# Patient Record
Sex: MALE | Race: WHITE | NOT HISPANIC OR LATINO | Employment: FULL TIME | URBAN - METROPOLITAN AREA
[De-identification: names, ages, dates, MRNs, and addresses within clinical notes are randomized per-mention and may not be internally consistent; named-entity substitution may affect disease eponyms.]

---

## 2023-11-13 ENCOUNTER — HOSPITAL ENCOUNTER (EMERGENCY)
Facility: HOSPITAL | Age: 33
Discharge: HOME | End: 2023-11-13
Attending: EMERGENCY MEDICINE
Payer: COMMERCIAL

## 2023-11-13 ENCOUNTER — APPOINTMENT (OUTPATIENT)
Dept: RADIOLOGY | Facility: HOSPITAL | Age: 33
End: 2023-11-13
Payer: COMMERCIAL

## 2023-11-13 VITALS
HEART RATE: 92 BPM | HEIGHT: 77 IN | SYSTOLIC BLOOD PRESSURE: 134 MMHG | OXYGEN SATURATION: 96 % | BODY MASS INDEX: 31.29 KG/M2 | WEIGHT: 265 LBS | RESPIRATION RATE: 19 BRPM | DIASTOLIC BLOOD PRESSURE: 83 MMHG | TEMPERATURE: 96.4 F

## 2023-11-13 DIAGNOSIS — M54.10 RADICULOPATHY OF ARM: ICD-10-CM

## 2023-11-13 DIAGNOSIS — M62.838 MUSCLE SPASM: ICD-10-CM

## 2023-11-13 DIAGNOSIS — R07.89 CHEST WALL PAIN: Primary | ICD-10-CM

## 2023-11-13 DIAGNOSIS — I10 PRIMARY HYPERTENSION: ICD-10-CM

## 2023-11-13 LAB
ALBUMIN SERPL BCP-MCNC: 4.4 G/DL (ref 3.4–5)
ALP SERPL-CCNC: 64 U/L (ref 33–120)
ALT SERPL W P-5'-P-CCNC: 51 U/L (ref 10–52)
ANION GAP SERPL CALC-SCNC: 14 MMOL/L (ref 10–20)
AST SERPL W P-5'-P-CCNC: 23 U/L (ref 9–39)
BILIRUB SERPL-MCNC: 0.3 MG/DL (ref 0–1.2)
BUN SERPL-MCNC: 13 MG/DL (ref 6–23)
CALCIUM SERPL-MCNC: 9.8 MG/DL (ref 8.6–10.3)
CARDIAC TROPONIN I PNL SERPL HS: 3 NG/L (ref 0–20)
CHLORIDE SERPL-SCNC: 101 MMOL/L (ref 98–107)
CO2 SERPL-SCNC: 27 MMOL/L (ref 21–32)
CREAT SERPL-MCNC: 1.01 MG/DL (ref 0.5–1.3)
ERYTHROCYTE [DISTWIDTH] IN BLOOD BY AUTOMATED COUNT: 12.8 % (ref 11.5–14.5)
GFR SERPL CREATININE-BSD FRML MDRD: >90 ML/MIN/1.73M*2
GLUCOSE SERPL-MCNC: 131 MG/DL (ref 74–99)
HCT VFR BLD AUTO: 45.2 % (ref 41–52)
HGB BLD-MCNC: 15 G/DL (ref 13.5–17.5)
MCH RBC QN AUTO: 29.7 PG (ref 26–34)
MCHC RBC AUTO-ENTMCNC: 33.2 G/DL (ref 32–36)
MCV RBC AUTO: 90 FL (ref 80–100)
NRBC BLD-RTO: 0 /100 WBCS (ref 0–0)
PLATELET # BLD AUTO: 328 X10*3/UL (ref 150–450)
POTASSIUM SERPL-SCNC: 5.2 MMOL/L (ref 3.5–5.3)
PROT SERPL-MCNC: 7.1 G/DL (ref 6.4–8.2)
RBC # BLD AUTO: 5.05 X10*6/UL (ref 4.5–5.9)
SODIUM SERPL-SCNC: 137 MMOL/L (ref 136–145)
WBC # BLD AUTO: 10.4 X10*3/UL (ref 4.4–11.3)

## 2023-11-13 PROCEDURE — 71046 X-RAY EXAM CHEST 2 VIEWS: CPT | Mod: FY

## 2023-11-13 PROCEDURE — 99285 EMERGENCY DEPT VISIT HI MDM: CPT | Mod: 25 | Performed by: EMERGENCY MEDICINE

## 2023-11-13 PROCEDURE — 99285 EMERGENCY DEPT VISIT HI MDM: CPT | Performed by: EMERGENCY MEDICINE

## 2023-11-13 PROCEDURE — 71046 X-RAY EXAM CHEST 2 VIEWS: CPT | Mod: COMPUTED RADIOGRAPHY X-RAY | Performed by: RADIOLOGY

## 2023-11-13 PROCEDURE — 84484 ASSAY OF TROPONIN QUANT: CPT | Performed by: EMERGENCY MEDICINE

## 2023-11-13 PROCEDURE — 36415 COLL VENOUS BLD VENIPUNCTURE: CPT | Performed by: EMERGENCY MEDICINE

## 2023-11-13 PROCEDURE — 2500000001 HC RX 250 WO HCPCS SELF ADMINISTERED DRUGS (ALT 637 FOR MEDICARE OP): Performed by: EMERGENCY MEDICINE

## 2023-11-13 PROCEDURE — 80053 COMPREHEN METABOLIC PANEL: CPT | Performed by: EMERGENCY MEDICINE

## 2023-11-13 PROCEDURE — 85027 COMPLETE CBC AUTOMATED: CPT | Performed by: EMERGENCY MEDICINE

## 2023-11-13 PROCEDURE — 99283 EMERGENCY DEPT VISIT LOW MDM: CPT | Mod: 25 | Performed by: EMERGENCY MEDICINE

## 2023-11-13 RX ORDER — LISINOPRIL 20 MG/1
20 TABLET ORAL DAILY
Qty: 30 TABLET | Refills: 0 | Status: SHIPPED | OUTPATIENT
Start: 2023-11-13 | End: 2023-12-13 | Stop reason: ALTCHOICE

## 2023-11-13 RX ORDER — LISINOPRIL 10 MG/1
10 TABLET ORAL ONCE
Status: COMPLETED | OUTPATIENT
Start: 2023-11-13 | End: 2023-11-13

## 2023-11-13 RX ORDER — LISINOPRIL 10 MG/1
10 TABLET ORAL DAILY
Status: DISCONTINUED | OUTPATIENT
Start: 2023-11-14 | End: 2023-11-13

## 2023-11-13 RX ORDER — ORPHENADRINE CITRATE 100 MG/1
100 TABLET, EXTENDED RELEASE ORAL 2 TIMES DAILY PRN
Qty: 20 TABLET | Refills: 0 | Status: SHIPPED | OUTPATIENT
Start: 2023-11-13 | End: 2023-12-06

## 2023-11-13 RX ADMIN — LISINOPRIL 10 MG: 10 TABLET ORAL at 23:04

## 2023-11-13 ASSESSMENT — COLUMBIA-SUICIDE SEVERITY RATING SCALE - C-SSRS
1. IN THE PAST MONTH, HAVE YOU WISHED YOU WERE DEAD OR WISHED YOU COULD GO TO SLEEP AND NOT WAKE UP?: NO
2. HAVE YOU ACTUALLY HAD ANY THOUGHTS OF KILLING YOURSELF?: NO
6. HAVE YOU EVER DONE ANYTHING, STARTED TO DO ANYTHING, OR PREPARED TO DO ANYTHING TO END YOUR LIFE?: NO

## 2023-11-13 ASSESSMENT — PAIN DESCRIPTION - PAIN TYPE: TYPE: ACUTE PAIN

## 2023-11-13 ASSESSMENT — HEART SCORE
TROPONIN: LESS THAN OR EQUAL TO NORMAL LIMIT
HISTORY: MODERATELY SUSPICIOUS
ECG: NORMAL
AGE: <45
RISK FACTORS: 1-2 RISK FACTORS
HEART SCORE: 2

## 2023-11-13 ASSESSMENT — LIFESTYLE VARIABLES
EVER FELT BAD OR GUILTY ABOUT YOUR DRINKING: NO
EVER HAD A DRINK FIRST THING IN THE MORNING TO STEADY YOUR NERVES TO GET RID OF A HANGOVER: NO
REASON UNABLE TO ASSESS: NO
HAVE YOU EVER FELT YOU SHOULD CUT DOWN ON YOUR DRINKING: NO
HAVE PEOPLE ANNOYED YOU BY CRITICIZING YOUR DRINKING: NO

## 2023-11-13 ASSESSMENT — PAIN DESCRIPTION - ORIENTATION: ORIENTATION: LEFT

## 2023-11-13 ASSESSMENT — PAIN SCALES - GENERAL: PAINLEVEL_OUTOF10: 7

## 2023-11-13 ASSESSMENT — PAIN DESCRIPTION - DESCRIPTORS
DESCRIPTORS: SHOOTING
DESCRIPTORS: ACHING

## 2023-11-13 ASSESSMENT — PAIN DESCRIPTION - LOCATION: LOCATION: CHEST

## 2023-11-13 ASSESSMENT — PAIN - FUNCTIONAL ASSESSMENT: PAIN_FUNCTIONAL_ASSESSMENT: 0-10

## 2023-11-13 NOTE — Clinical Note
Michele Johnson was seen and treated in our emergency department on 11/13/2023.  He may return to work on 11/15/2023.       If you have any questions or concerns, please don't hesitate to call.      Mike Marcelino, DO

## 2023-11-14 NOTE — DISCHARGE INSTRUCTIONS
Please return to the emergency room if you develop any worsening chest pain, shortness of breath, nausea, sweats, dizziness or lightheadedness, or any worsening concerning symptoms as this can be signs of a heart issue.  Please increase your lisinopril to 20 mg daily starting tomorrow morning.  And check your blood pressure and follow-up with Dr. Gotti your primary care doctor in the next few days.  Dr. Ledezma is the cardiologist you have been referred to, please call to schedule appointment for this week for outpatient evaluation.

## 2023-11-14 NOTE — ED PROVIDER NOTES
HPI   Chief Complaint   Patient presents with    Chest Pain     States Cp x 2 days in mid chest that would come and go, stated today CP has moved to left side and radiates to arm       Patient is a 33-year-old male presenting to [facility] ED with past medical history of hypertension with complaints of chest pain for the last 2 days.  Patient reports that chest pain is intermittent.  Describes that chest pain is left side primarily, also described as a generalized pressure.  Patient reports pain is a 2 out of 10 at baseline.  With movement of the left arm he describes it to be a 10 out of 10 pain.  Patient is a non-smoker.  Patient does have history of MI in the father from which she passed.  Patient denies any shortness of breath, nausea, vomiting, diarrhea, abdominal pain, fever, chills, or weakness.  Denies any recent trauma, strenuous activity, exercise that may have exacerbated injury.  Patient also reporting some tingling running down the entirety of the left upper extremity.                          No data recorded                Patient History   Past Medical History:   Diagnosis Date    Acute atopic conjunctivitis, bilateral     Acute allergic conjunctivitis of both eyes    Personal history of other diseases of the nervous system and sense organs 10/06/2021    History of tension headache    Personal history of other endocrine, nutritional and metabolic disease 10/06/2021    History of obesity    Personal history of other specified conditions 10/06/2021    History of excessive sweating     Past Surgical History:   Procedure Laterality Date    OTHER SURGICAL HISTORY  10/05/2021    No history of surgery     No family history on file.  Social History     Tobacco Use    Smoking status: Never    Smokeless tobacco: Never   Substance Use Topics    Alcohol use: Not on file    Drug use: Never       Physical Exam   ED Triage Vitals [11/13/23 2111]   Temp Heart Rate Resp BP   35.8 °C (96.4 °F) 96 19 (!) 183/98       SpO2 Temp Source Heart Rate Source Patient Position   97 % Tympanic -- Sitting      BP Location FiO2 (%)     Left arm --       Physical Exam  Constitutional:       Appearance: Normal appearance. He is normal weight.   HENT:      Head: Normocephalic and atraumatic.      Nose: Nose normal.      Mouth/Throat:      Mouth: Mucous membranes are moist.      Pharynx: Oropharynx is clear.   Eyes:      Extraocular Movements: Extraocular movements intact.      Conjunctiva/sclera: Conjunctivae normal.      Pupils: Pupils are equal, round, and reactive to light.   Cardiovascular:      Rate and Rhythm: Normal rate and regular rhythm.      Pulses: Normal pulses.      Heart sounds: Normal heart sounds.   Pulmonary:      Effort: Pulmonary effort is normal.      Breath sounds: Normal breath sounds.   Abdominal:      General: Abdomen is flat. Bowel sounds are normal.      Palpations: Abdomen is soft.   Musculoskeletal:      Cervical back: Normal range of motion and neck supple.      Comments: Significant pain on range of motion of left shoulder.   Skin:     General: Skin is warm and dry.      Capillary Refill: Capillary refill takes less than 2 seconds.   Neurological:      General: No focal deficit present.      Mental Status: He is alert and oriented to person, place, and time. Mental status is at baseline.   Psychiatric:         Mood and Affect: Mood normal.         Behavior: Behavior normal.         ED Course & MDM   Diagnoses as of 11/13/23 9177   Chest wall pain   Radiculopathy of arm   Primary hypertension       Medical Decision Making  Patient is a 33 y.o. male who presents to [facility] ED for Chest Pain (States Cp x 2 days in mid chest that would come and go, stated today CP has moved to left side and radiates to arm). On initial ED evaluation, patient found to be in no acute distress. Per HPI, concern to evaluate and treat for possible ACS.  Obtaining relevant cardiac work-up and imaging.  EKG unremarkable, no acute ST changes  noted.  Chest x-ray negative for any acute cardiopulmonary process.  Lab work unremarkable, troponin of 3.  Patient hypertensive at 183 systolic.  Patient given 1x 10 mg dose of lisinopril in the ED.  Patient to be increased to 20 mg lisinopril daily outpatient.  Given significant family medical history of young heart disease and MI, patient would benefit from cardiac follow-up outpatient very soon.  Patient provided cardiac referral.      Rx given for 20 mg lisinopril daily outpatient. Patient to follow up with PCP and cardiology outpatient. Anticipatory guidance and return precautions provided.  Patient otherwise stable for discharge.          Procedure  Procedures     Hailey Vasquez MD  Resident  11/13/23 1857    The patient was seen by the resident/fellow.  I have personally performed a substantive portion of the encounter.  I have seen and examined the patient; agree with the workup, evaluation, MDM, management and diagnosis.  The care plan has been discussed with the resident/fellow; I have reviewed the resident/fellow’s note and agree with the documented findings with the exception/addition of the following:    This pain has been ongoing, for days and does get worse with lifting the arm, moving the arm, and rotating the upper chest.  Appears to be going from the angle of the ribs coming to the manubrium, over to the armpit.  And it is reproducible with rib manipulation as well as lifting the left arm.  He does feel better with the anti-inflammatory he took at home.  At this time his EKG does not show any acute changes, and his blood pressure remains elevated.  He states he believes his blood pressure has been elevated now for several weeks, last checked in September and was already high and he did not follow-up, or change his blood pressure medications at that time.  The patient was asked to increase his lisinopril to 20 mg daily and follow-up with Dr. Hayden in the office this week.  He was also given   Brisa for cardiology to follow-up with this week as well for outpatient stress test given the young cardiac disease in his family.  He was instructed that if he develops any worsening symptoms, symptoms that are accompanied with shortness of breath, sweats, dizziness or lightheadedness, or any passing out episodes, that he is to return to the emergency room immediately for repeat evaluation to rule out acute cardiac emergency.  The patient states he will return for any worsening symptoms.  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  Otherwise he will increase his blood pressure medication, and follow-up with his doctors as directed.  Consultation referral was made in the computer for Dr. Ledezma she could be seen this week.       Mike Marcelino, DO  11/14/23 0539

## 2023-11-15 ENCOUNTER — HOSPITAL ENCOUNTER (OUTPATIENT)
Dept: CARDIOLOGY | Facility: HOSPITAL | Age: 33
Discharge: HOME | End: 2023-11-15
Payer: COMMERCIAL

## 2023-11-15 LAB
ATRIAL RATE: 85 BPM
P AXIS: 39 DEGREES
P OFFSET: 191 MS
P ONSET: 132 MS
PR INTERVAL: 162 MS
Q ONSET: 213 MS
QRS COUNT: 14 BEATS
QRS DURATION: 92 MS
QT INTERVAL: 362 MS
QTC CALCULATION(BAZETT): 430 MS
QTC FREDERICIA: 406 MS
R AXIS: -29 DEGREES
T AXIS: 32 DEGREES
T OFFSET: 394 MS
VENTRICULAR RATE: 85 BPM

## 2023-11-15 PROCEDURE — 93005 ELECTROCARDIOGRAM TRACING: CPT

## 2023-11-21 ENCOUNTER — OFFICE VISIT (OUTPATIENT)
Dept: CARDIOLOGY | Facility: CLINIC | Age: 33
End: 2023-11-21
Payer: COMMERCIAL

## 2023-11-21 VITALS
BODY MASS INDEX: 37.19 KG/M2 | HEART RATE: 84 BPM | WEIGHT: 315 LBS | DIASTOLIC BLOOD PRESSURE: 92 MMHG | OXYGEN SATURATION: 98 % | SYSTOLIC BLOOD PRESSURE: 152 MMHG | RESPIRATION RATE: 18 BRPM | HEIGHT: 77 IN

## 2023-11-21 DIAGNOSIS — I10 ESSENTIAL HYPERTENSION: ICD-10-CM

## 2023-11-21 DIAGNOSIS — R07.9 CHEST PAIN, UNSPECIFIED TYPE: Primary | ICD-10-CM

## 2023-11-21 PROBLEM — R53.83 FATIGUE: Status: ACTIVE | Noted: 2023-11-21

## 2023-11-21 PROBLEM — E78.2 MIXED HYPERLIPIDEMIA: Status: ACTIVE | Noted: 2023-11-21

## 2023-11-21 PROBLEM — L72.9 SCALP CYST: Status: ACTIVE | Noted: 2023-11-21

## 2023-11-21 PROBLEM — T50.B95A ADVERSE EFFECT OF OTHER VIRAL VACCINES, INITIAL ENCOUNTER: Status: ACTIVE | Noted: 2023-11-21

## 2023-11-21 PROBLEM — R60.9 DEPENDENT EDEMA: Status: ACTIVE | Noted: 2023-11-21

## 2023-11-21 PROBLEM — K21.9 ACID REFLUX: Status: ACTIVE | Noted: 2023-11-21

## 2023-11-21 PROBLEM — E66.01 MORBID OBESITY WITH BODY MASS INDEX (BMI) OF 40.0 OR HIGHER (MULTI): Status: ACTIVE | Noted: 2023-11-21

## 2023-11-21 PROBLEM — H00.011 HORDEOLUM OF RIGHT UPPER EYELID: Status: ACTIVE | Noted: 2023-11-21

## 2023-11-21 PROBLEM — M79.669 PAIN IN THE SHINS: Status: ACTIVE | Noted: 2023-11-21

## 2023-11-21 PROBLEM — R73.09 ELEVATED HEMOGLOBIN A1C: Status: ACTIVE | Noted: 2023-11-21

## 2023-11-21 PROBLEM — R09.81 SINUS CONGESTION: Status: ACTIVE | Noted: 2023-11-21

## 2023-11-21 PROCEDURE — 99204 OFFICE O/P NEW MOD 45 MIN: CPT | Performed by: NURSE PRACTITIONER

## 2023-11-21 PROCEDURE — 93000 ELECTROCARDIOGRAM COMPLETE: CPT | Performed by: NURSE PRACTITIONER

## 2023-11-21 PROCEDURE — 1036F TOBACCO NON-USER: CPT | Performed by: NURSE PRACTITIONER

## 2023-11-21 PROCEDURE — 3080F DIAST BP >= 90 MM HG: CPT | Performed by: NURSE PRACTITIONER

## 2023-11-21 PROCEDURE — 3077F SYST BP >= 140 MM HG: CPT | Performed by: NURSE PRACTITIONER

## 2023-11-21 RX ORDER — PREDNISONE 20 MG/1
40 TABLET ORAL DAILY
COMMUNITY
End: 2023-11-21 | Stop reason: ALTCHOICE

## 2023-11-21 RX ORDER — HYDROCHLOROTHIAZIDE 25 MG/1
25 TABLET ORAL DAILY
Qty: 90 TABLET | Refills: 3 | Status: SHIPPED | OUTPATIENT
Start: 2023-11-21 | End: 2024-03-06 | Stop reason: SDUPTHER

## 2023-11-21 RX ORDER — DOXYCYCLINE 50 MG/1
CAPSULE ORAL
COMMUNITY
Start: 2021-06-16 | End: 2023-11-21 | Stop reason: ALTCHOICE

## 2023-11-21 RX ORDER — FUROSEMIDE 20 MG/1
1 TABLET ORAL DAILY PRN
COMMUNITY
Start: 2021-07-28 | End: 2023-11-21 | Stop reason: ALTCHOICE

## 2023-11-21 RX ORDER — CYCLOBENZAPRINE HCL 10 MG
TABLET ORAL
COMMUNITY
Start: 2023-06-26 | End: 2023-11-21 | Stop reason: ALTCHOICE

## 2023-11-21 NOTE — PATIENT INSTRUCTIONS
- start hydrochlorothiazide 25mg once a day in the morning  - Coronary Calcium Scan:  a specialized X-ray test that provides pictures of your heart that can help your Provider detect and measure calcium-containing plaque in your arteries. Plaque inside the arteries of your heart can grow and restrict blood flow to the muscles of your heart. Measuring calcified plaque with a heart scan may allow your Provider to identify possible coronary artery disease before you have signs and symptoms.   - Keep a diet log & track your daily sodium intake. No more than 2,000 mg in a day. Bring this log with you when you see me as well.   - Review DASH diet handout that I provided for you   - follow up with me in about 2-3 weeks for repeat BP    It was my pleasure to meet you. I look forward to being your cardiac Nurse Practitioner. I am a huge believer in communicating with my patients. Please contact me at any time, if anything is not clear to you regarding anything we have discussed, or if new questions occur to you.

## 2023-11-21 NOTE — PROGRESS NOTES
"Name : Michele Johnson   : 1990   MRN : [id number]   ENC Date : 2023    CC: NPV- Chest Pain     HPI:    Michele Johnson is a 33 y.o. male with PMHx sig for HTN & HLD who presents today with c/o chest pain.    Interval Hx:   [facility] ED visit 23 with c/o CP that had lasted for 2 days. Work up unremarkable. No ischemic changes on EKG, troponin 3. HTN on evaluation & given an additional dose of Lisinopril 10mg every day with instructions to increase home dose to 20mg every day & see cardiology outpatient.    No further chest pain since. No other associated cardiac symptoms    Seen by cardiology as late teen for a murmur, mother remembered but he does not. These records are not available to me    PMH/PSH: as above    SHx:  He reports that he quit smoking about 10 years ago. His smoking use included cigarettes. He has a 0.50 pack-year smoking history. He has been exposed to tobacco smoke. He has never used smokeless tobacco. He reports that he does not currently use alcohol. He reports that he does not use drugs.  Work-  in [place] & works part-time at [employer] as a     FHx:  Mother- MVP  Father-  in his 40s of MI    Allergies:  Penicillins; Covid-19 vaccine, ad26.cov2.s (ignacia); and Covid-19 vaccine, mrna, [id number], lnp-s (moderna)    Outpatient Medications:  Current Outpatient Medications   Medication Instructions    furosemide (Lasix) 20 mg tablet 1 tablet, oral, Daily PRN    lisinopril 20 mg, oral, Daily    orphenadrine (NORFLEX) 100 mg, oral, 2 times daily PRN, Do not crush, chew, or split.       Last Recorded Vitals:  Vitals:    23 1610   BP: (!) 152/92   BP Location: Left arm   Patient Position: Sitting   Pulse: 84   Resp: 18   SpO2: 98%   Weight: (!) 169 kg (372 lb 4.8 oz)   Height: 1.956 m (6' 5\")     Physical Exam:  On exam Mr. Johnson appears his stated age, is alert and oriented x3, and in no acute distress. His sclera are anicteric and his " oropharynx has moist mucous membranes. His neck is supple and without thyromegaly. The JVP is ~5 cm of water above the right atrium. His cardiac exam has regular rhythm, normal S1, S2. No S3/4. There are no murmurs. His lungs are clear to auscultation bilaterally and there is no dullness to percussion. His abdomen is soft, nontender with normoactive bowel sounds. There is no HJR. The extremities are warm and without edema. The skin is dry. There is no rash present. The distal pulses are 2-3+ in all four extremities. His mood and affect are appropriate for todays encounter.      Last Labs:  CBC -  Lab Results   Component Value Date    WBC 10.4 11/13/2023    HGB 15.0 11/13/2023    HCT 45.2 11/13/2023    MCV 90 11/13/2023     11/13/2023       CMP -  Lab Results   Component Value Date    CALCIUM 9.8 11/13/2023    PROT 7.1 11/13/2023    ALBUMIN 4.4 11/13/2023    AST 23 11/13/2023    ALT 51 11/13/2023    ALKPHOS 64 11/13/2023    BILITOT 0.3 11/13/2023       LIPID PANEL -   Lab Results   Component Value Date    CHOL 225 (H) 10/06/2021    TRIG 284 (H) 10/06/2021    HDL 33.0 (A) 10/06/2021    CHHDL 6.8 (A) 10/06/2021    LDLF 135 (H) 10/06/2021    VLDL 57 (H) 10/06/2021    NHDL 192 10/06/2021       RENAL FUNCTION PANEL -   Lab Results   Component Value Date    GLUCOSE 131 (H) 11/13/2023     11/13/2023    K 5.2 11/13/2023     11/13/2023    CO2 27 11/13/2023    ANIONGAP 14 11/13/2023    BUN 13 11/13/2023    CREATININE 1.01 11/13/2023    CALCIUM 9.8 11/13/2023    ALBUMIN 4.4 11/13/2023        Lab Results   Component Value Date    HGBA1C 5.7 (A) 10/06/2021     I have reviewed the above labs & diagnostics    Assessment/Plan:  Chest Pain. Has not recurred. No ischemic changes on EKG. Given comorbid conditions further risk stratification with CT cardiac score test    HTN. /92 mmHg today. Lisinopril increased to 20mg every day in ED. Needs better BP control. Symptoms may very well be related to HTN. Dietary  indiscretion.  - start hydrochlorothiazide 25mg every day  - Keep a diet log & track your daily sodium intake. No more than 2,000 mg in a day. Bring this log with you when you see me as well.   - Review DASH diet handout that I provided for you     Follow up 2 weeks for BP management     Tracy M Schwab, CARLOS-CNP

## 2023-11-22 PROBLEM — R07.9 CHEST PAIN: Status: ACTIVE | Noted: 2023-11-22

## 2023-11-29 ENCOUNTER — APPOINTMENT (OUTPATIENT)
Dept: RADIOLOGY | Facility: HOSPITAL | Age: 33
End: 2023-11-29
Payer: COMMERCIAL

## 2023-12-01 ENCOUNTER — HOSPITAL ENCOUNTER (OUTPATIENT)
Dept: RADIOLOGY | Facility: HOSPITAL | Age: 33
Discharge: HOME | End: 2023-12-01
Payer: COMMERCIAL

## 2023-12-01 DIAGNOSIS — R07.9 CHEST PAIN, UNSPECIFIED TYPE: ICD-10-CM

## 2023-12-01 PROCEDURE — 75571 CT HRT W/O DYE W/CA TEST: CPT

## 2023-12-05 PROBLEM — J32.9 CHRONIC SINUSITIS: Status: ACTIVE | Noted: 2023-12-05

## 2023-12-05 PROBLEM — K21.9 GASTROESOPHAGEAL REFLUX DISEASE: Status: ACTIVE | Noted: 2022-10-16

## 2023-12-05 PROBLEM — R61 EXCESSIVE SWEATING: Status: ACTIVE | Noted: 2023-12-05

## 2023-12-05 PROBLEM — G44.209 TENSION TYPE HEADACHE: Status: ACTIVE | Noted: 2023-12-05

## 2023-12-05 PROBLEM — J01.90 ACUTE SINUSITIS: Status: ACTIVE | Noted: 2018-11-30

## 2023-12-05 PROBLEM — G54.9 NERVE ROOT DISORDER: Status: ACTIVE | Noted: 2023-12-05

## 2023-12-05 PROBLEM — R25.2 SPASM: Status: ACTIVE | Noted: 2023-12-05

## 2023-12-06 ENCOUNTER — OFFICE VISIT (OUTPATIENT)
Dept: CARDIOLOGY | Facility: CLINIC | Age: 33
End: 2023-12-06
Payer: COMMERCIAL

## 2023-12-06 VITALS
WEIGHT: 315 LBS | HEART RATE: 91 BPM | SYSTOLIC BLOOD PRESSURE: 124 MMHG | DIASTOLIC BLOOD PRESSURE: 78 MMHG | HEIGHT: 77 IN | OXYGEN SATURATION: 96 % | BODY MASS INDEX: 37.19 KG/M2 | RESPIRATION RATE: 18 BRPM

## 2023-12-06 DIAGNOSIS — R07.9 CHEST PAIN, UNSPECIFIED TYPE: Primary | ICD-10-CM

## 2023-12-06 DIAGNOSIS — I10 BENIGN HYPERTENSION: ICD-10-CM

## 2023-12-06 PROCEDURE — 3078F DIAST BP <80 MM HG: CPT | Performed by: NURSE PRACTITIONER

## 2023-12-06 PROCEDURE — 99213 OFFICE O/P EST LOW 20 MIN: CPT | Performed by: NURSE PRACTITIONER

## 2023-12-06 PROCEDURE — 3074F SYST BP LT 130 MM HG: CPT | Performed by: NURSE PRACTITIONER

## 2023-12-06 PROCEDURE — 1036F TOBACCO NON-USER: CPT | Performed by: NURSE PRACTITIONER

## 2023-12-06 NOTE — PROGRESS NOTES
"Name : Michele Johnson   : 1990   MRN : [id number]   ENC Date : 2023    CC: NPV- follow up testing Chest Pain     HPI:    Michele Johnson is a 33 y.o. male with PMHx sig for HTN & HLD who presents today to review testing prompted by c/o chest pain.    Interval Hx:   [facility] ED visit 23 with c/o CP that had lasted for 2 days. Work up unremarkable. No ischemic changes on EKG, troponin 3. HTN on evaluation & given an additional dose of Lisinopril 10mg every day with instructions to increase home dose to 20mg every day & see cardiology outpatient.    No further chest pain since. No other associated cardiac symptoms    Seen by cardiology as late teen for a murmur, mother remembered but he does not. These records are not available to me    PMH/PSH: as above    SHx:  He reports that he quit smoking about 10 years ago. His smoking use included cigarettes. He has a 0.50 pack-year smoking history. He has been exposed to tobacco smoke. He has never used smokeless tobacco. He reports that he does not currently use alcohol. He reports that he does not use drugs.  Work-  in [place] & works part-time at [facility] [employer] as a     FHx:  Mother- MVP  Father-  in his 40s of MI    Allergies:  Penicillins; Covid-19 vaccine, ad26.cov2.s (ignacia); and Covid-19 vaccine, mrna, [id number], lnp-s (moderna)    Outpatient Medications:  Current Outpatient Medications   Medication Instructions    furosemide (Lasix) 20 mg tablet 1 tablet, oral, Daily PRN    lisinopril 20 mg, oral, Daily    orphenadrine (NORFLEX) 100 mg, oral, 2 times daily PRN, Do not crush, chew, or split.       Last Recorded Vitals:  Vitals:    23 1610   BP: (!) 152/92   BP Location: Left arm   Patient Position: Sitting   Pulse: 84   Resp: 18   SpO2: 98%   Weight: (!) 169 kg (372 lb 4.8 oz)   Height: 1.956 m (6' 5\")     Physical Exam:  On exam Mr. Johnson appears his stated age, is alert and oriented x3, and in no acute " distress. His sclera are anicteric and his oropharynx has moist mucous membranes. His neck is supple and without thyromegaly. The JVP is ~5 cm of water above the right atrium. His cardiac exam has regular rhythm, normal S1, S2. No S3/4. There are no murmurs. His lungs are clear to auscultation bilaterally and there is no dullness to percussion. His abdomen is soft, nontender with normoactive bowel sounds. There is no HJR. The extremities are warm and without edema. The skin is dry. There is no rash present. The distal pulses are 2-3+ in all four extremities. His mood and affect are appropriate for todays encounter.      Last Labs:  CBC -  Lab Results   Component Value Date    WBC 10.4 11/13/2023    HGB 15.0 11/13/2023    HCT 45.2 11/13/2023    MCV 90 11/13/2023     11/13/2023       CMP -  Lab Results   Component Value Date    CALCIUM 9.8 11/13/2023    PROT 7.1 11/13/2023    ALBUMIN 4.4 11/13/2023    AST 23 11/13/2023    ALT 51 11/13/2023    ALKPHOS 64 11/13/2023    BILITOT 0.3 11/13/2023       LIPID PANEL -   Lab Results   Component Value Date    CHOL 225 (H) 10/06/2021    TRIG 284 (H) 10/06/2021    HDL 33.0 (A) 10/06/2021    CHHDL 6.8 (A) 10/06/2021    LDLF 135 (H) 10/06/2021    VLDL 57 (H) 10/06/2021    NHDL 192 10/06/2021       RENAL FUNCTION PANEL -   Lab Results   Component Value Date    GLUCOSE 131 (H) 11/13/2023     11/13/2023    K 5.2 11/13/2023     11/13/2023    CO2 27 11/13/2023    ANIONGAP 14 11/13/2023    BUN 13 11/13/2023    CREATININE 1.01 11/13/2023    CALCIUM 9.8 11/13/2023    ALBUMIN 4.4 11/13/2023        Lab Results   Component Value Date    HGBA1C 5.7 (A) 10/06/2021     I have reviewed the above labs & diagnostics    Assessment/Plan:  Chest Pain. Has not recurred. No ischemic changes on EKG. Given comorbid conditions further risk stratification with CT cardiac score test, done on 12/1/23. Total score 0. No further cardiac workup at this time    HTN. /78 mmHg today. When I  saw him a few weeks ago I added hydrochlorothiazide 25mg every day to his regimen. Coupled with the Lisinopril 20mg every day, seems to be working well, nicely controlled. Continue to follow DASH diet, exercise & lose weight.    See PCP for routine care & repeat lipid panel.  Follow up as needed.    Tracy M Schwab, APRN-CNP

## 2023-12-06 NOTE — PATIENT INSTRUCTIONS
Follow up closely with PCP. Follow up with me as needed.    It was my pleasure to meet you. I look forward to being your cardiac Nurse Practitioner. I am a huge believer in communicating with my patients. Please contact me at any time, if anything is not clear to you regarding anything we have discussed, or if new questions occur to you.

## 2023-12-13 ENCOUNTER — OFFICE VISIT (OUTPATIENT)
Dept: PRIMARY CARE | Facility: CLINIC | Age: 33
End: 2023-12-13
Payer: COMMERCIAL

## 2023-12-13 ENCOUNTER — APPOINTMENT (OUTPATIENT)
Dept: PRIMARY CARE | Facility: CLINIC | Age: 33
End: 2023-12-13
Payer: COMMERCIAL

## 2023-12-13 VITALS
DIASTOLIC BLOOD PRESSURE: 92 MMHG | HEART RATE: 89 BPM | SYSTOLIC BLOOD PRESSURE: 147 MMHG | HEIGHT: 77 IN | BODY MASS INDEX: 37.19 KG/M2 | WEIGHT: 315 LBS | TEMPERATURE: 98 F

## 2023-12-13 DIAGNOSIS — I10 ESSENTIAL HYPERTENSION: ICD-10-CM

## 2023-12-13 DIAGNOSIS — R07.89 CHEST WALL PAIN: ICD-10-CM

## 2023-12-13 PROCEDURE — 3077F SYST BP >= 140 MM HG: CPT | Performed by: STUDENT IN AN ORGANIZED HEALTH CARE EDUCATION/TRAINING PROGRAM

## 2023-12-13 PROCEDURE — 3080F DIAST BP >= 90 MM HG: CPT | Performed by: STUDENT IN AN ORGANIZED HEALTH CARE EDUCATION/TRAINING PROGRAM

## 2023-12-13 PROCEDURE — 99213 OFFICE O/P EST LOW 20 MIN: CPT | Performed by: STUDENT IN AN ORGANIZED HEALTH CARE EDUCATION/TRAINING PROGRAM

## 2023-12-13 PROCEDURE — 1036F TOBACCO NON-USER: CPT | Performed by: STUDENT IN AN ORGANIZED HEALTH CARE EDUCATION/TRAINING PROGRAM

## 2023-12-13 RX ORDER — LISINOPRIL 20 MG/1
20 TABLET ORAL DAILY
Qty: 90 TABLET | Refills: 1 | Status: SHIPPED | OUTPATIENT
Start: 2023-12-13 | End: 2024-03-06 | Stop reason: SDUPTHER

## 2023-12-13 ASSESSMENT — ENCOUNTER SYMPTOMS
DIARRHEA: 0
SORE THROAT: 0
CONSTIPATION: 0
SHORTNESS OF BREATH: 0
NAUSEA: 0
FEVER: 0
CHILLS: 0
JOINT SWELLING: 0
DYSURIA: 0
ABDOMINAL PAIN: 0
FREQUENCY: 0
PALPITATIONS: 0
ARTHRALGIAS: 0
DIZZINESS: 0
VOMITING: 0
FATIGUE: 0
FACIAL SWELLING: 0
COUGH: 0
WHEEZING: 0
RHINORRHEA: 0

## 2023-12-13 NOTE — PROGRESS NOTES
"Subjective   Patient ID: Michele Johnson is a 33 y.o. male who presents for Med Refill.    Med Refill  Pertinent negatives include no abdominal pain, arthralgias, chest pain, chills, congestion, coughing, fatigue, fever, joint swelling, nausea, rash, sore throat or vomiting.   Follow-up of hypertension.  Recently saw cardiology and was evaluated for Cardiologic concerns.  Workup was negative.  No further workup necessary according to cardiology.  Elevated blood pressure.  Blood pressure today 147/92.  Recently started on 20 mg lisinopril roughly a few days ago.  Has been on 10 mg for the longest time.  Needs refills on medications.  Will be out of medications the next few days.  Denies any chest pain, shortness breath, headaches.  Overall is been feeling significantly better.  States that a lot of this stems from his anxiety.  Has also been working a lot of hours.  Trying to slow down as well as getting his life together.  No other concerns today.  Denies any thoughts or himself or others.      Review of Systems   Constitutional:  Negative for chills, fatigue and fever.   HENT:  Negative for congestion, ear pain, facial swelling, hearing loss, rhinorrhea and sore throat.    Respiratory:  Negative for cough, shortness of breath and wheezing.    Cardiovascular:  Negative for chest pain and palpitations.   Gastrointestinal:  Negative for abdominal pain, constipation, diarrhea, nausea and vomiting.   Genitourinary:  Negative for dysuria and frequency.   Musculoskeletal:  Negative for arthralgias and joint swelling.   Skin:  Negative for rash.   Neurological:  Negative for dizziness and syncope.       Objective   BP (!) 147/92   Pulse 89   Temp 36.7 °C (98 °F) (Temporal)   Ht 1.956 m (6' 5\")   Wt (!) 169 kg (373 lb)   BMI 44.23 kg/m²     Physical Exam  HENT:      Head: Normocephalic and atraumatic.      Right Ear: Tympanic membrane, ear canal and external ear normal.      Left Ear: Tympanic membrane, ear canal and " external ear normal.      Nose: Nose normal.      Mouth/Throat:      Mouth: Mucous membranes are moist.      Pharynx: Oropharynx is clear. No posterior oropharyngeal erythema.   Eyes:      Conjunctiva/sclera: Conjunctivae normal.   Cardiovascular:      Rate and Rhythm: Normal rate and regular rhythm.      Pulses: Normal pulses.   Pulmonary:      Effort: Pulmonary effort is normal.      Breath sounds: Normal breath sounds.   Abdominal:      General: Abdomen is flat.      Palpations: Abdomen is soft.   Skin:     General: Skin is warm and dry.   Neurological:      General: No focal deficit present.      Mental Status: He is alert.   Psychiatric:         Mood and Affect: Mood normal.         Behavior: Behavior normal.         Thought Content: Thought content normal.         Judgment: Judgment normal.       Assessment/Plan   Continue blood pressure medication.  Follow-up in 90 days for reevaluation of blood pressure.

## 2024-03-06 ENCOUNTER — OFFICE VISIT (OUTPATIENT)
Dept: PRIMARY CARE | Facility: CLINIC | Age: 34
End: 2024-03-06
Payer: COMMERCIAL

## 2024-03-06 VITALS
OXYGEN SATURATION: 96 % | DIASTOLIC BLOOD PRESSURE: 98 MMHG | HEIGHT: 77 IN | BODY MASS INDEX: 37.19 KG/M2 | TEMPERATURE: 97.7 F | WEIGHT: 315 LBS | SYSTOLIC BLOOD PRESSURE: 158 MMHG | HEART RATE: 85 BPM

## 2024-03-06 DIAGNOSIS — R73.9 HYPERGLYCEMIA: ICD-10-CM

## 2024-03-06 DIAGNOSIS — I10 ESSENTIAL HYPERTENSION: ICD-10-CM

## 2024-03-06 DIAGNOSIS — R05.1 ACUTE COUGH: ICD-10-CM

## 2024-03-06 DIAGNOSIS — R73.09 ELEVATED HEMOGLOBIN A1C: Primary | ICD-10-CM

## 2024-03-06 DIAGNOSIS — R09.81 NASAL CONGESTION: ICD-10-CM

## 2024-03-06 PROBLEM — J01.90 ACUTE SINUSITIS: Status: RESOLVED | Noted: 2018-11-30 | Resolved: 2024-03-06

## 2024-03-06 PROBLEM — T50.B95A ADVERSE EFFECT OF OTHER VIRAL VACCINES, INITIAL ENCOUNTER: Status: RESOLVED | Noted: 2023-11-21 | Resolved: 2024-03-06

## 2024-03-06 PROCEDURE — 99214 OFFICE O/P EST MOD 30 MIN: CPT | Performed by: INTERNAL MEDICINE

## 2024-03-06 PROCEDURE — 3080F DIAST BP >= 90 MM HG: CPT | Performed by: INTERNAL MEDICINE

## 2024-03-06 PROCEDURE — 1036F TOBACCO NON-USER: CPT | Performed by: INTERNAL MEDICINE

## 2024-03-06 PROCEDURE — 3077F SYST BP >= 140 MM HG: CPT | Performed by: INTERNAL MEDICINE

## 2024-03-06 RX ORDER — HYDROCHLOROTHIAZIDE 25 MG/1
25 TABLET ORAL DAILY
Qty: 90 TABLET | Refills: 1 | Status: SHIPPED | OUTPATIENT
Start: 2024-03-06 | End: 2025-03-06

## 2024-03-06 RX ORDER — LISINOPRIL 20 MG/1
20 TABLET ORAL DAILY
Qty: 90 TABLET | Refills: 1 | Status: SHIPPED | OUTPATIENT
Start: 2024-03-06 | End: 2024-09-02

## 2024-03-06 RX ORDER — AZITHROMYCIN 250 MG/1
TABLET, FILM COATED ORAL
Qty: 6 TABLET | Refills: 0 | Status: SHIPPED | OUTPATIENT
Start: 2024-03-06 | End: 2024-03-11

## 2024-03-06 RX ORDER — FLUTICASONE PROPIONATE 50 MCG
1 SPRAY, SUSPENSION (ML) NASAL DAILY
Qty: 16 G | Refills: 0 | Status: SHIPPED | OUTPATIENT
Start: 2024-03-06 | End: 2025-03-06

## 2024-03-06 ASSESSMENT — PAIN SCALES - GENERAL: PAINLEVEL: 0-NO PAIN

## 2024-03-06 ASSESSMENT — PATIENT HEALTH QUESTIONNAIRE - PHQ9
1. LITTLE INTEREST OR PLEASURE IN DOING THINGS: NOT AT ALL
2. FEELING DOWN, DEPRESSED OR HOPELESS: NOT AT ALL
SUM OF ALL RESPONSES TO PHQ9 QUESTIONS 1 & 2: 0

## 2024-03-06 NOTE — PROGRESS NOTES
"Standard Progress Note  Chief Complaint   Patient presents with    Med Refill     Pt of Dr. Gotti here for refill.      Cough     Pt of Dr. Gotti here for cough that is occasionally moist.  Pt needs refill on lisinopril.     Am productive cough. Cough started 2 weeks ago.  Does not feel like he is sick now. Did have nasal congestion and drainage in the beginning.  Now burning sensation in the last few days  Phlegm is better last few days.   No allergy symptoms. Tried claritin and zyrtec  No sinus tenderness or fever/chills  Missed bp medication for a few days.       HPI:  Michele Johnson 33 y.o.   male    Patient Active Problem List   Diagnosis    Sinus congestion    Scalp cyst    Pain in the shins    Morbid obesity with body mass index (BMI) of 40.0 or higher (CMS/HCC)    Mixed hyperlipidemia    Hordeolum of right upper eyelid    Fatigue    Elevated hemoglobin A1c    Dependent edema    Essential hypertension    Benign hypertension    Gastroesophageal reflux disease    Chest pain    Excessive sweating    Nerve root disorder    Chronic sinusitis    Spasm    Tension type headache       Blood pressure (!) 158/98, pulse 85, temperature 36.5 °C (97.7 °F), height 1.956 m (6' 5\"), weight (!) 171 kg (377 lb 6.4 oz), SpO2 96 %.  Body mass index is 44.75 kg/m².          Vital reviewed  Throat no exudate  Sinuses: nontender  Ears TM intact  Neck: no cervical/clavicular adenopathy  CV: RRR S1 S2 normal. No murmur.   Lungs: CTA without wrr. Breath sounds symmetric  Neuro: speech intact.         Lab Results   Component Value Date    GLUCOSE 131 (H) 11/13/2023    CALCIUM 9.8 11/13/2023     11/13/2023    K 5.2 11/13/2023    CO2 27 11/13/2023     11/13/2023    BUN 13 11/13/2023    CREATININE 1.01 11/13/2023      Lab Results   Component Value Date    WBC 10.4 11/13/2023    HGB 15.0 11/13/2023    HCT 45.2 11/13/2023    MCV 90 11/13/2023     11/13/2023      Lab Results   Component Value Date    CHOL 225 (H) " "10/06/2021     Lab Results   Component Value Date    HDL 33.0 (A) 10/06/2021     No results found for: \"LDLCALC\"  Lab Results   Component Value Date    TRIG 284 (H) 10/06/2021     No components found for: \"CHOLHDL\"    1. Essential hypertension  Uncontrolled discussed with pt  Advised otc coricidin as should not affect bp  Also advised f/up with PCP-can review if should have sleep study. Obtain labs 1 week prior to appt [facility]  - lisinopril 20 mg tablet; Take 1 tablet (20 mg) by mouth once daily.  Dispense: 90 tablet; Refill: 1  - hydroCHLOROthiazide (HYDRODiuril) 25 mg tablet; Take 1 tablet (25 mg) by mouth once daily.  Dispense: 90 tablet; Refill: 1  - CBC; Future  - Comprehensive Metabolic Panel; Future  - Lipid Panel; Future    2. Elevated hemoglobin A1c  Per chart.   - Hemoglobin A1C; Future    3. Hyperglycemia  He is not sure if was fasting when had last labs  He does recall accucheck around 95   - CBC; Future  - Comprehensive Metabolic Panel; Future  - Hemoglobin A1C; Future    4. Acute cough    - azithromycin (Zithromax) 250 mg tablet; Take 2 tablets (500 mg) by mouth once daily for 1 day, THEN 1 tablet (250 mg) once daily for 4 days. Take 2 tabs (500 mg) by mouth today, than 1 daily for 4 days..  Dispense: 6 tablet; Refill: 0    5. Nasal congestion  Medication use discussed with patient including potential benefits and side effects. Patient verbalized understanding and agreed to proceed.     - fluticasone (Flonase) 50 mcg/actuation nasal spray; Administer 1 spray into each nostril once daily. Shake gently. Before first use, prime pump. After use, clean tip and replace cap.  Dispense: 16 g; Refill: 0          Current Outpatient Medications on File Prior to Visit   Medication Sig Dispense Refill    hydroCHLOROthiazide (HYDRODiuril) 25 mg tablet Take 1 tablet (25 mg) by mouth once daily. 90 tablet 3    lisinopril 20 mg tablet Take 1 tablet (20 mg) by mouth once daily. 90 tablet 1    orphenadrine (Norflex) 100 " mg 12 hr tablet Take 1 tablet (100 mg) by mouth 2 times a day as needed for muscle spasms for up to 10 days. Do not crush, chew, or split. 20 tablet 0     No current facility-administered medications on file prior to visit.         Jolie Mattson MD

## 2024-03-07 ENCOUNTER — LAB (OUTPATIENT)
Dept: LAB | Facility: LAB | Age: 34
End: 2024-03-07
Payer: COMMERCIAL

## 2024-03-07 DIAGNOSIS — I10 ESSENTIAL HYPERTENSION: ICD-10-CM

## 2024-03-07 DIAGNOSIS — R73.09 ELEVATED HEMOGLOBIN A1C: ICD-10-CM

## 2024-03-07 DIAGNOSIS — R73.9 HYPERGLYCEMIA: ICD-10-CM

## 2024-03-07 LAB
ALBUMIN SERPL BCP-MCNC: 4.6 G/DL (ref 3.4–5)
ALP SERPL-CCNC: 70 U/L (ref 33–120)
ALT SERPL W P-5'-P-CCNC: 43 U/L (ref 10–52)
ANION GAP SERPL CALC-SCNC: 13 MMOL/L (ref 10–20)
AST SERPL W P-5'-P-CCNC: 23 U/L (ref 9–39)
BILIRUB SERPL-MCNC: 0.5 MG/DL (ref 0–1.2)
BUN SERPL-MCNC: 15 MG/DL (ref 6–23)
CALCIUM SERPL-MCNC: 9.8 MG/DL (ref 8.6–10.3)
CHLORIDE SERPL-SCNC: 101 MMOL/L (ref 98–107)
CHOLEST SERPL-MCNC: 205 MG/DL (ref 0–199)
CHOLESTEROL/HDL RATIO: 6.2
CO2 SERPL-SCNC: 29 MMOL/L (ref 21–32)
CREAT SERPL-MCNC: 0.83 MG/DL (ref 0.5–1.3)
EGFRCR SERPLBLD CKD-EPI 2021: >90 ML/MIN/1.73M*2
ERYTHROCYTE [DISTWIDTH] IN BLOOD BY AUTOMATED COUNT: 12.8 % (ref 11.5–14.5)
EST. AVERAGE GLUCOSE BLD GHB EST-MCNC: 123 MG/DL
GLUCOSE SERPL-MCNC: 91 MG/DL (ref 74–99)
HBA1C MFR BLD: 5.9 %
HCT VFR BLD AUTO: 45.9 % (ref 41–52)
HDLC SERPL-MCNC: 33.2 MG/DL
HGB BLD-MCNC: 15.1 G/DL (ref 13.5–17.5)
LDLC SERPL CALC-MCNC: 113 MG/DL
MCH RBC QN AUTO: 29.1 PG (ref 26–34)
MCHC RBC AUTO-ENTMCNC: 32.9 G/DL (ref 32–36)
MCV RBC AUTO: 88 FL (ref 80–100)
NON HDL CHOLESTEROL: 172 MG/DL (ref 0–149)
NRBC BLD-RTO: 0 /100 WBCS (ref 0–0)
PLATELET # BLD AUTO: 382 X10*3/UL (ref 150–450)
POTASSIUM SERPL-SCNC: 5 MMOL/L (ref 3.5–5.3)
PROT SERPL-MCNC: 7 G/DL (ref 6.4–8.2)
RBC # BLD AUTO: 5.19 X10*6/UL (ref 4.5–5.9)
SODIUM SERPL-SCNC: 138 MMOL/L (ref 136–145)
TRIGL SERPL-MCNC: 292 MG/DL (ref 0–149)
VLDL: 58 MG/DL (ref 0–40)
WBC # BLD AUTO: 9.4 X10*3/UL (ref 4.4–11.3)

## 2024-03-07 PROCEDURE — 85027 COMPLETE CBC AUTOMATED: CPT

## 2024-03-07 PROCEDURE — 83036 HEMOGLOBIN GLYCOSYLATED A1C: CPT

## 2024-03-07 PROCEDURE — 80061 LIPID PANEL: CPT

## 2024-03-07 PROCEDURE — 36415 COLL VENOUS BLD VENIPUNCTURE: CPT

## 2024-03-07 PROCEDURE — 80053 COMPREHEN METABOLIC PANEL: CPT

## 2024-03-08 ENCOUNTER — TELEPHONE (OUTPATIENT)
Dept: PRIMARY CARE | Facility: CLINIC | Age: 34
End: 2024-03-08
Payer: COMMERCIAL

## 2024-03-08 NOTE — TELEPHONE ENCOUNTER
----- Message from Jolie Shields MD sent at 3/8/2024  8:25 AM EST -----  Patient of Dr Gotti. Cholesterol 205. Goal under 200. Low hdl, the good cholesterol at 33. Goal 45 or above. High triglycerides (fat in blood) at 292. Goal 150 or less. Low fat diet recommended. A1c, average sugar elevated at 5.9 goal 5.6 or less. Recommend avoid simple sugars such as white bread, potatoes etc.

## 2024-03-26 ENCOUNTER — OFFICE VISIT (OUTPATIENT)
Dept: PRIMARY CARE | Facility: CLINIC | Age: 34
End: 2024-03-26
Payer: COMMERCIAL

## 2024-03-26 VITALS
HEART RATE: 77 BPM | BODY MASS INDEX: 37.19 KG/M2 | WEIGHT: 315 LBS | HEIGHT: 77 IN | TEMPERATURE: 98 F | DIASTOLIC BLOOD PRESSURE: 75 MMHG | SYSTOLIC BLOOD PRESSURE: 131 MMHG

## 2024-03-26 DIAGNOSIS — R73.09 ELEVATED HEMOGLOBIN A1C: ICD-10-CM

## 2024-03-26 DIAGNOSIS — R73.03 PREDIABETES: ICD-10-CM

## 2024-03-26 DIAGNOSIS — E78.2 MIXED HYPERLIPIDEMIA: ICD-10-CM

## 2024-03-26 DIAGNOSIS — I10 ESSENTIAL HYPERTENSION: Primary | ICD-10-CM

## 2024-03-26 DIAGNOSIS — E66.01 CLASS 3 SEVERE OBESITY DUE TO EXCESS CALORIES WITH SERIOUS COMORBIDITY AND BODY MASS INDEX (BMI) OF 45.0 TO 49.9 IN ADULT (MULTI): ICD-10-CM

## 2024-03-26 PROBLEM — R05.1 ACUTE COUGH: Status: ACTIVE | Noted: 2024-03-26

## 2024-03-26 PROBLEM — R07.89 CHEST WALL PAIN: Status: ACTIVE | Noted: 2023-11-22

## 2024-03-26 PROBLEM — M79.606 PAIN OF LOWER EXTREMITY: Status: ACTIVE | Noted: 2023-11-21

## 2024-03-26 PROBLEM — R09.81 NASAL CONGESTION: Status: ACTIVE | Noted: 2024-03-26

## 2024-03-26 PROCEDURE — 3008F BODY MASS INDEX DOCD: CPT | Performed by: INTERNAL MEDICINE

## 2024-03-26 PROCEDURE — 3075F SYST BP GE 130 - 139MM HG: CPT | Performed by: INTERNAL MEDICINE

## 2024-03-26 PROCEDURE — 3078F DIAST BP <80 MM HG: CPT | Performed by: INTERNAL MEDICINE

## 2024-03-26 PROCEDURE — 99213 OFFICE O/P EST LOW 20 MIN: CPT | Performed by: INTERNAL MEDICINE

## 2024-03-26 RX ORDER — SEMAGLUTIDE 0.25 MG/.5ML
0.25 INJECTION, SOLUTION SUBCUTANEOUS
Qty: 2 ML | Refills: 0 | Status: SHIPPED | OUTPATIENT
Start: 2024-03-26 | End: 2024-04-17

## 2024-03-26 RX ORDER — SEMAGLUTIDE 0.25 MG/.5ML
0.25 INJECTION, SOLUTION SUBCUTANEOUS
Qty: 2 ML | Refills: 0 | Status: SHIPPED | OUTPATIENT
Start: 2024-03-26 | End: 2024-03-26 | Stop reason: SDUPTHER

## 2024-03-26 RX ORDER — PANTOPRAZOLE SODIUM 40 MG/1
40 TABLET, DELAYED RELEASE ORAL DAILY
COMMUNITY
Start: 2024-03-15

## 2024-03-26 NOTE — PROGRESS NOTES
"Subjective   Patient ID: Michele Johnson is a 33 y.o. male who presents for Follow-up (Er follow up lab results ).    HPI     Review of Systems    Objective   /75   Pulse 77   Temp 36.7 °C (98 °F) (Temporal)   Ht 1.956 m (6' 5\")   Wt (!) 172 kg (380 lb)   BMI 45.06 kg/m²     Physical Exam    Assessment/Plan   Problem List Items Addressed This Visit             ICD-10-CM    Mixed hyperlipidemia E78.2    Relevant Medications    semaglutide, weight loss, (Wegovy) 0.25 mg/0.5 mL pen injector    Other Relevant Orders    Referral to Nutrition Services    Elevated hemoglobin A1c R73.09    Relevant Orders    Referral to Nutrition Services    Essential hypertension - Primary I10    Relevant Medications    semaglutide, weight loss, (Wegovy) 0.25 mg/0.5 mL pen injector    Other Relevant Orders    Referral to Nutrition Services    Prediabetes R73.03    Relevant Medications    semaglutide, weight loss, (Wegovy) 0.25 mg/0.5 mL pen injector    Other Relevant Orders    Referral to Nutrition Services    Class 3 severe obesity due to excess calories with serious comorbidity and body mass index (BMI) of 45.0 to 49.9 in adult (Multi) E66.01, Z68.42    Relevant Medications    semaglutide, weight loss, (Wegovy) 0.25 mg/0.5 mL pen injector    Other Relevant Orders    Referral to Nutrition Services          "

## 2024-04-03 ENCOUNTER — OFFICE VISIT (OUTPATIENT)
Dept: URGENT CARE | Facility: CLINIC | Age: 34
End: 2024-04-03
Payer: COMMERCIAL

## 2024-04-03 VITALS
RESPIRATION RATE: 20 BRPM | OXYGEN SATURATION: 97 % | HEART RATE: 95 BPM | SYSTOLIC BLOOD PRESSURE: 140 MMHG | TEMPERATURE: 97.7 F | DIASTOLIC BLOOD PRESSURE: 84 MMHG

## 2024-04-03 DIAGNOSIS — U07.1 COVID-19 VIRUS INFECTION: Primary | ICD-10-CM

## 2024-04-03 DIAGNOSIS — R05.9 COUGH IN ADULT: ICD-10-CM

## 2024-04-03 LAB
POC TRIPLEX FLU A-AG: ABNORMAL
POC TRIPLEX FLU B-AG: ABNORMAL
POC TRIPLEX SARSCOV-2 AG: ABNORMAL

## 2024-04-03 PROCEDURE — 3077F SYST BP >= 140 MM HG: CPT | Performed by: PHYSICIAN ASSISTANT

## 2024-04-03 PROCEDURE — 3008F BODY MASS INDEX DOCD: CPT | Performed by: PHYSICIAN ASSISTANT

## 2024-04-03 PROCEDURE — 3079F DIAST BP 80-89 MM HG: CPT | Performed by: PHYSICIAN ASSISTANT

## 2024-04-03 PROCEDURE — 99203 OFFICE O/P NEW LOW 30 MIN: CPT | Performed by: PHYSICIAN ASSISTANT

## 2024-04-03 PROCEDURE — 87428 SARSCOV & INF VIR A&B AG IA: CPT | Performed by: PHYSICIAN ASSISTANT

## 2024-04-03 RX ORDER — DEXAMETHASONE 6 MG/1
6 TABLET ORAL DAILY
Qty: 5 TABLET | Refills: 0 | Status: SHIPPED | OUTPATIENT
Start: 2024-04-03 | End: 2024-04-08

## 2024-04-03 RX ORDER — BENZONATATE 100 MG/1
100 CAPSULE ORAL 3 TIMES DAILY PRN
Qty: 30 CAPSULE | Refills: 0 | Status: SHIPPED | OUTPATIENT
Start: 2024-04-03 | End: 2024-04-13

## 2024-04-03 ASSESSMENT — ENCOUNTER SYMPTOMS
CHILLS: 1
MYALGIAS: 1
COUGH: 1

## 2024-04-03 ASSESSMENT — PAIN SCALES - GENERAL: PAINLEVEL: 2

## 2024-04-03 NOTE — PROGRESS NOTES
Subjective   Patient ID: Michele Johnson is a 33 y.o. male.    Patient is a 33-year-old male who complains of chills, myalgia and cough that he has been experiencing for the past 2 days.  Patient reports that a coworker demonstrated similar symptoms over the weekend.  Patient denies congestion, sinus pressure, ear pain or sore throat.  Patient reports no specific fever and denies history of asthma or COPD.  Patient does not smoke.      Cough  Associated symptoms include chills and myalgias.   The following portions of the chart were reviewed this encounter and updated as appropriate:       Review of Systems   Constitutional:  Positive for chills.   Respiratory:  Positive for cough.    Musculoskeletal:  Positive for myalgias.   All other systems reviewed and are negative.  Objective   Physical Exam  Vitals and nursing note reviewed.   Constitutional:       Appearance: Normal appearance. He is normal weight.   HENT:      Head: Normocephalic and atraumatic.      Right Ear: Tympanic membrane, ear canal and external ear normal.      Left Ear: Tympanic membrane, ear canal and external ear normal.      Nose: Nose normal. No congestion or rhinorrhea.      Mouth/Throat:      Mouth: Mucous membranes are moist.      Pharynx: Oropharynx is clear. No oropharyngeal exudate or posterior oropharyngeal erythema.   Eyes:      Extraocular Movements: Extraocular movements intact.      Conjunctiva/sclera: Conjunctivae normal.      Pupils: Pupils are equal, round, and reactive to light.   Cardiovascular:      Rate and Rhythm: Normal rate and regular rhythm.      Pulses: Normal pulses.      Heart sounds: Normal heart sounds.   Pulmonary:      Effort: Pulmonary effort is normal. No respiratory distress.      Breath sounds: Normal breath sounds. No stridor. No wheezing, rhonchi or rales.   Musculoskeletal:      Cervical back: Normal range of motion and neck supple.   Skin:     General: Skin is warm and dry.      Capillary Refill: Capillary  refill takes less than 2 seconds.   Neurological:      General: No focal deficit present.      Mental Status: He is alert and oriented to person, place, and time.   Psychiatric:         Mood and Affect: Mood normal.         Behavior: Behavior normal.         Thought Content: Thought content normal.         Judgment: Judgment normal.     Assessment/Plan   Physical exam findings as noted above.  Triplex Ag was performed and influenza A/B is negative and SARS-CoV-2 is positive.  Patient was provided with prescriptions for Decadron 6 mg and Tessalon 100 mg.  Patient was provided with documentation confirming his COVID-19 virus infection.  Additional supportive care was discussed and the patient verbalizes excellent understanding of same.    CLINICAL IMPRESSION:  COVID-19 Virus Infection    Diagnoses and all orders for this visit:  COVID-19 virus infection  -     benzonatate (Tessalon Perles) 100 mg capsule; Take 1 capsule (100 mg) by mouth 3 times a day as needed for cough for up to 10 days.  -     dexAMETHasone (Decadron) 6 mg tablet; Take 1 tablet (6 mg) by mouth once daily for 5 days.  Cough in adult  -     POCT BD Veritor Triplex Ag manually resulted      Patient disposition: Home

## 2024-04-03 NOTE — LETTER
April 3, 2024     Patient: Michele Johnson   YOB: 1990   Date of Visit: 4/3/2024       To Whom It May Concern:    It is my medical opinion that Michele Johnson may return to work on 08 April 2024 .    If you have any questions or concerns, please don't hesitate to call.    Michele Johnson did test POSITIVE for COVID-19 today.  Influenza test today is NEGATIVE.     Sincerely,        Dami Porter PA-C    CC: No Recipients

## 2024-04-15 PROBLEM — R73.03 PREDIABETES: Status: ACTIVE | Noted: 2024-04-15

## 2024-04-15 PROBLEM — E66.01 CLASS 3 SEVERE OBESITY DUE TO EXCESS CALORIES WITH SERIOUS COMORBIDITY AND BODY MASS INDEX (BMI) OF 45.0 TO 49.9 IN ADULT (MULTI): Status: ACTIVE | Noted: 2024-04-15

## 2024-04-15 PROBLEM — E66.813 CLASS 3 SEVERE OBESITY DUE TO EXCESS CALORIES WITH SERIOUS COMORBIDITY AND BODY MASS INDEX (BMI) OF 45.0 TO 49.9 IN ADULT: Status: ACTIVE | Noted: 2024-04-15

## 2024-06-30 ENCOUNTER — OFFICE VISIT (OUTPATIENT)
Dept: URGENT CARE | Facility: CLINIC | Age: 34
End: 2024-06-30
Payer: COMMERCIAL

## 2024-06-30 VITALS
OXYGEN SATURATION: 96 % | RESPIRATION RATE: 20 BRPM | HEART RATE: 88 BPM | TEMPERATURE: 99 F | SYSTOLIC BLOOD PRESSURE: 126 MMHG | DIASTOLIC BLOOD PRESSURE: 82 MMHG

## 2024-06-30 DIAGNOSIS — J40 BRONCHITIS: Primary | ICD-10-CM

## 2024-06-30 PROCEDURE — 3074F SYST BP LT 130 MM HG: CPT | Performed by: PHYSICIAN ASSISTANT

## 2024-06-30 PROCEDURE — 99214 OFFICE O/P EST MOD 30 MIN: CPT | Performed by: PHYSICIAN ASSISTANT

## 2024-06-30 PROCEDURE — 3008F BODY MASS INDEX DOCD: CPT | Performed by: PHYSICIAN ASSISTANT

## 2024-06-30 PROCEDURE — 3079F DIAST BP 80-89 MM HG: CPT | Performed by: PHYSICIAN ASSISTANT

## 2024-06-30 RX ORDER — BENZONATATE 200 MG/1
200 CAPSULE ORAL 3 TIMES DAILY PRN
Qty: 20 CAPSULE | Refills: 0 | Status: SHIPPED | OUTPATIENT
Start: 2024-06-30 | End: 2024-07-07

## 2024-06-30 RX ORDER — DOXYCYCLINE 100 MG/1
100 CAPSULE ORAL 2 TIMES DAILY
Qty: 20 CAPSULE | Refills: 0 | Status: SHIPPED | OUTPATIENT
Start: 2024-06-30 | End: 2024-07-10

## 2024-06-30 ASSESSMENT — ENCOUNTER SYMPTOMS
NEUROLOGICAL NEGATIVE: 1
PSYCHIATRIC NEGATIVE: 1
SORE THROAT: 0
MUSCULOSKELETAL NEGATIVE: 1
FEVER: 0
HEMATOLOGIC/LYMPHATIC NEGATIVE: 1
CARDIOVASCULAR NEGATIVE: 1
GASTROINTESTINAL NEGATIVE: 1
COUGH: 1
SHORTNESS OF BREATH: 0
ENDOCRINE NEGATIVE: 1
ALLERGIC/IMMUNOLOGIC NEGATIVE: 1
EYES NEGATIVE: 1

## 2024-06-30 ASSESSMENT — PAIN SCALES - GENERAL: PAINLEVEL: 5

## 2024-06-30 NOTE — PROGRESS NOTES
Subjective   Patient ID: Michele Johnson is a 33 y.o. male.      History provided by:  Patient   used: No    Cough  Pertinent negatives include no ear pain, fever, sore throat or shortness of breath.   This is a 33 yr old male here for respiratory sxs. Cough productive of yellow/brown, blood tinged sputum, and nasal congestion x 4 days. No dyspnea, peripheral edema, fever, sore throat or ear pain. Non smoker. No hx of asthma or COPD. No recent long car or plane travel, recent hospitalization or immobilization.     Review of Systems   Constitutional:  Negative for fever.   HENT:  Positive for congestion. Negative for ear pain and sore throat.    Eyes: Negative.    Respiratory:  Positive for cough. Negative for shortness of breath.    Cardiovascular: Negative.    Gastrointestinal: Negative.    Endocrine: Negative.    Genitourinary: Negative.    Musculoskeletal: Negative.    Skin: Negative.    Allergic/Immunologic: Negative.    Neurological: Negative.    Hematological: Negative.    Psychiatric/Behavioral: Negative.     All other systems reviewed and are negative.  /82   Pulse 88   Temp 37.2 °C (99 °F)   Resp 20   SpO2 96%     Objective   Physical Exam  Vitals and nursing note reviewed.   Constitutional:       Appearance: Normal appearance.   HENT:      Head: Normocephalic and atraumatic.      Right Ear: Tympanic membrane and ear canal normal.      Left Ear: Tympanic membrane and ear canal normal.      Mouth/Throat:      Mouth: Mucous membranes are moist.      Pharynx: Oropharynx is clear.   Cardiovascular:      Rate and Rhythm: Normal rate and regular rhythm.   Pulmonary:      Effort: Pulmonary effort is normal.      Breath sounds: Normal breath sounds.   Musculoskeletal:      Cervical back: Neck supple.      Right lower leg: No edema.      Left lower leg: No edema.   Lymphadenopathy:      Cervical: No cervical adenopathy.   Skin:     General: Skin is warm and dry.   Neurological:       General: No focal deficit present.      Mental Status: He is alert and oriented to person, place, and time.   Psychiatric:         Mood and Affect: Mood normal.         Behavior: Behavior normal.     Assessment:  Bronchitis    Plan:  Doxycycline 100 mg bid x 10 days  Watch sun exposure while on this antibiotic  Tessalon perles 200 mg tid prn cough, do not chew  Increase clear fluids  Pcp follow up this week if not improving or worsening  ER visit anytime 24/7 for acute worsening or changing condition

## 2024-08-09 ENCOUNTER — APPOINTMENT (OUTPATIENT)
Dept: OTOLARYNGOLOGY | Facility: CLINIC | Age: 34
End: 2024-08-09
Payer: COMMERCIAL

## 2024-08-19 ENCOUNTER — APPOINTMENT (OUTPATIENT)
Dept: OTOLARYNGOLOGY | Facility: CLINIC | Age: 34
End: 2024-08-19
Payer: COMMERCIAL

## 2024-08-19 VITALS
DIASTOLIC BLOOD PRESSURE: 97 MMHG | HEIGHT: 77 IN | SYSTOLIC BLOOD PRESSURE: 156 MMHG | TEMPERATURE: 97.7 F | BODY MASS INDEX: 45.06 KG/M2

## 2024-08-19 DIAGNOSIS — G47.30 SLEEP-DISORDERED BREATHING: ICD-10-CM

## 2024-08-19 DIAGNOSIS — J30.9 CHRONIC ALLERGIC RHINITIS: ICD-10-CM

## 2024-08-19 DIAGNOSIS — R09.81 CHRONIC NASAL CONGESTION: Primary | ICD-10-CM

## 2024-08-19 DIAGNOSIS — R09.82 POSTNASAL DRIP: ICD-10-CM

## 2024-08-19 PROCEDURE — 3077F SYST BP >= 140 MM HG: CPT | Performed by: OTOLARYNGOLOGY

## 2024-08-19 PROCEDURE — 99204 OFFICE O/P NEW MOD 45 MIN: CPT | Performed by: OTOLARYNGOLOGY

## 2024-08-19 PROCEDURE — 3080F DIAST BP >= 90 MM HG: CPT | Performed by: OTOLARYNGOLOGY

## 2024-08-19 PROCEDURE — 1036F TOBACCO NON-USER: CPT | Performed by: OTOLARYNGOLOGY

## 2024-08-19 RX ORDER — FLUTICASONE PROPIONATE 50 MCG
2 SPRAY, SUSPENSION (ML) NASAL DAILY
Qty: 48 ML | Refills: 3 | Status: SHIPPED | OUTPATIENT
Start: 2024-08-19 | End: 2024-11-17

## 2024-08-19 NOTE — PROGRESS NOTES
Impression:  1. Chronic nasal congestion        2. Chronic allergic rhinitis  fluticasone (Flonase) 50 mcg/actuation nasal spray      3. Postnasal drip        4. Sleep-disordered breathing  Referral to Adult Sleep Medicine           RECOMMENDATIONS/PLAN :  I reassured the patient there is no evidence of any infectious drainage however I do want to start him rinsing his nose using a sinus rinse kit to help remove any thickened mucus and we will restart his Flonase nasal spray-2 puffs each nostril daily.  Due to his sleep disordered breathing and possible obstructive sleep apnea, I would be happy to refer him to sleep medicine for an evaluation and possible sleep study.      **This electronic medical record note was created with the use of voice recognition software.  Despite proofreading, typographical or grammatical errors may be present that could affect meaning of content **    Subjective   Patient ID:     Michele Johnson is a 34 y.o. male who presents to the office today complaining of intermittent nasal congestion with thick postnasal drip and this seems to be worse in the morning.  He also states that he snores extremely loudly and he has daytime fatigue.  He believes that he may have obstructive sleep apnea because he does wake up frequently throughout the night.  He has not had a formal sleep study as of yet.  He denies repetitive sinus infections.  Previously he was treated for gastroesophageal reflux disease however he has not been having any issues recently.    ROS:  A detailed 12 system review of systems is noted on the intake form has been reviewed with the patient with details noted in the HPI and scanned into the patient's medical record.    Objective     Past Medical History:   Diagnosis Date    Acute atopic conjunctivitis, bilateral     Acute allergic conjunctivitis of both eyes    Personal history of other diseases of the nervous system and sense organs 10/06/2021    History of tension headache     "Personal history of other endocrine, nutritional and metabolic disease 10/06/2021    History of obesity    Personal history of other specified conditions 10/06/2021    History of excessive sweating        Past Surgical History:   Procedure Laterality Date    OTHER SURGICAL HISTORY  10/05/2021    No history of surgery        Allergies   Allergen Reactions    Penicillins Anaphylaxis, Fever and Rash    Amoxicillin Hives    Covid-19 Vaccine, Ad26.Cov2.S ([facility]) Unknown    Covid-19 Vaccine, Mrna, [id number], Lnp-S (Moderna) Fever, Headache, Other and Nausea Only          Current Outpatient Medications:     hydroCHLOROthiazide (HYDRODiuril) 25 mg tablet, Take 1 tablet (25 mg) by mouth once daily., Disp: 90 tablet, Rfl: 1    lisinopril 20 mg tablet, Take 1 tablet (20 mg) by mouth once daily., Disp: 90 tablet, Rfl: 1    dexAMETHasone (Decadron) 6 mg tablet, Take 1 tablet (6 mg) by mouth once daily for 5 days., Disp: 5 tablet, Rfl: 0    fluticasone (Flonase) 50 mcg/actuation nasal spray, Administer 2 sprays into each nostril once daily., Disp: 48 mL, Rfl: 3    orphenadrine (Norflex) 100 mg 12 hr tablet, Take 1 tablet (100 mg) by mouth 2 times a day as needed for muscle spasms for up to 10 days. Do not crush, chew, or split., Disp: 20 tablet, Rfl: 0    pantoprazole (ProtoNix) 40 mg EC tablet, Take 1 tablet (40 mg) by mouth once daily., Disp: , Rfl:     semaglutide, weight loss, (Wegovy) 0.25 mg/0.5 mL pen injector, Inject 0.25 mg under the skin 1 (one) time per week for 4 doses., Disp: 2 mL, Rfl: 0     Tobacco Use: Medium Risk (8/19/2024)    Patient History     Smoking Tobacco Use: Former     Smokeless Tobacco Use: Never     Passive Exposure: Past        Alcohol Use: Not on file        Social History     Substance and Sexual Activity   Drug Use Never        Physical Exam:  Visit Vitals  BP (!) 156/97   Temp 36.5 °C (97.7 °F) (Temporal)   Ht 1.956 m (6' 5\")   BMI 45.06 kg/m²   Smoking Status Former   BSA 3.06 m²      General: " Patient is alert, oriented, cooperative in no apparent distress.  Head: Normocephalic, atraumatic.  Eyes: PERRL, EOMI, Conjunctiva is clear. No nystagmus.  Ears: Right Ear-- Pinna is normal.  External auditory canal is patent. Tympanic membrane is [intact, translucent and has good mobility with my pneumatic otoscope. No effusion].  Mastoid is nontender.  Left ear-- Pinna is normal.  External auditory canal is patent. Tympanic membrane is [intact, translucent and has good mobility with my pneumatic otoscope.  No effusion].  Mastoid is nontender.  Nose: Septum is straight.  No septal perforation or lesions. No septal hematoma/ seroma.  No signs of bleeding.  Inferior turbinates are mildly swollen.   No evidence of intranasal polyps.  No infectious drainage.  Throat:  Floor of mouth is clear, no masses.  Tongue appears normal, no lesions or masses. Gums, gingiva, buccal mucosa appear pink and moist, no lesions. Teeth are in good repair.  No obvious dental infections.  Peritonsillar regions appear symmetric without swelling.  Hard and soft palate appear normal, no obvious cleft. Uvula is midline.  Oropharynx: No lesions. Retropharyngeal wall is flat.  No active postnasal drip.  Neck: Supple,  no lymphadenopathy.  No masses.  Salivary Glands: Symmetric bilaterally.  No palpable masses.  No evidence of acute infection or salivary stones  Neurologic: Cranial Nerves 2-12 are grossly intact without focal deficits. Cerebellar function testing is normal.     Results:   []    Procedure:   []    Richardson Cardenas DO

## 2024-10-15 PROBLEM — I10 BENIGN HYPERTENSION: Status: RESOLVED | Noted: 2023-11-21 | Resolved: 2024-10-15

## 2024-10-15 NOTE — PROGRESS NOTES
[facility]  [address]  [facility]  [address]  [facility] [address]     [facility]  New Visit Note      Subjective   Patient ID: Michele Johnson is a 34 y.o. male with past medical history significant for Morbid Obesity, Hypertension, Nasal Congestion, Gastroesophageal reflux disease, and Sleep disorder breathing.    10/24/2024: The patient is here alone today and was referred by Richardson Cardenas DO, for comprehensive sleep medicine evaluation due to suspected sleep apnea, excessive daytime sleepiness/fatigue, difficulty staying asleep and sleep talking, snoring, witnessed apnea, waking up gasping, nasal congestion, morning headache, morning dry mouth irritability in daytime, drowsy driving, mouth breathing, night sweats during sleep, and nocturnal cough, and nocturia. His ESS is 17, VIRGIL is 20, and neck circumference is 20 inches today.      HPI  Patient had been having these symptoms for the past 2-3 years. Patient never had sleep study done yet.       SLEEP STUDY HISTORY: (personally reviewed raw data such as interpretation report, data sheet, hypnogram, and titration table if available and applicable)  N/A    SLEEP-WAKE SCHEDULE  Bedtime: 10-11 PM on weekdays, 10:30-11:30 PM on weekends  Subjective sleep latency: 15-20 minutes  Problems falling asleep: No  Number of awakenings: 2-3 times per night spontaneously for BR  Falls back asleep in 5 minutes  Problems staying asleep: Yes  Final wake time: 6 AM on weekdays, 9-10 AM on weekends  Out of bed time: 6 AM on weekdays, 10 AM on weekends  Shift work: 1st shift  Naps: Yes, 2-3 hours  Feels rested after a nap: Yes   Average sleep duration (excluding naps): 5-6 hours    SLEEP ENVIRONMENT  Sleep location: bed  Sleep status: sleeps alone  Room is dark:  Yes  Room is quiet: Yes  Room is cool: Yes  Bed comfort: good    SLEEP HABITS:   Activities before  bedtime: watch TV  Activities in bed:  no  Preferred sleep position: stomach and side    SLEEP ROS:  Night symptoms: POSITIVE for snoring, witnessed apnea, wake up gasping and/or choking for air, nasal congestion , mouth breathing, night sweats during sleep, and nocturnal cough  Morning symptoms: POSITIVE for unrefreshing sleep, morning headache, and morning dry mouth  Daytime symptoms POSITIVE for excessive daytime sleepiness, fatigue, irritability in daytime, and drowsy driving  Hypersomnia / narcolepsy symptoms: Patient denies symptoms of a hypersomnolence disorder such as sleep paralysis, sleep-related hallucinations, and cataplexy.   RLS symptoms: Patient denies RLS symptoms.  Movements in sleep: Patient denies problematic movements in sleep such as seizures during sleep, frequent leg kicks / jerks while asleep, sleep-related bruxism, and waking up with bedsheets in disarray.  Parasomnia symptoms: Patient denies symptoms of parasomnia such as sleepwalking, sleeptalking, sleep-eating, acting out dreams, and nightmares.     WEIGHT: stable    REVIEW OF SYSTEMS: All other systems have been reviewed and are negative.    PERTINENT SOCIAL HISTORY:  Occupation: law enforcement  Smoking: No   ETOH: No   Marijuana: No   Caffeine: No  Sleep aids: No   Claustrophobia: Yes     PERTINENT PAST SURGICAL HISTORY:  non-contributory    PERTINENT FAMILY HISTORY:  sleep apnea- mother    Active Problems, Allergy List, Medication List, and PMH/PSH/FH/Social Hx have been reviewed and reconciled in chart. No significant changes unless documented in the pertinent chart section. Updates made when necessary.       Objective   Vitals:    10/24/24 1110   BP: 161/90   Pulse: 93   Resp: 16   Temp: 35.9 °C (96.6 °F)   SpO2: 96%      REVIEW OF SYSTEMS  All other systems have been reviewed and are negative.    ALLERGIES  Allergies   Allergen Reactions    Penicillins Anaphylaxis, Fever and Rash    Amoxicillin Hives    Covid-19 Vaccine,  Ad26.Cov2.S ([facility]) Unknown    Covid-19 Vaccine, Mrna, [id number], Lnp-S (Moderna) Fever, Headache, Other and Nausea Only       MEDICATIONS  Current Outpatient Medications   Medication Sig Dispense Refill    dexAMETHasone (Decadron) 6 mg tablet Take 1 tablet (6 mg) by mouth once daily for 5 days. 5 tablet 0    fluticasone (Flonase) 50 mcg/actuation nasal spray Administer 2 sprays into each nostril once daily. 48 mL 3    hydroCHLOROthiazide (HYDRODiuril) 25 mg tablet Take 1 tablet (25 mg) by mouth once daily. 90 tablet 1    lisinopril 20 mg tablet Take 1 tablet (20 mg) by mouth once daily. 90 tablet 1    orphenadrine (Norflex) 100 mg 12 hr tablet Take 1 tablet (100 mg) by mouth 2 times a day as needed for muscle spasms for up to 10 days. Do not crush, chew, or split. 20 tablet 0    pantoprazole (ProtoNix) 40 mg EC tablet Take 1 tablet (40 mg) by mouth once daily.      semaglutide, weight loss, (Wegovy) 0.25 mg/0.5 mL pen injector Inject 0.25 mg under the skin 1 (one) time per week for 4 doses. 2 mL 0     No current facility-administered medications for this visit.         Physical Exam  Constitutional:alert and oriented to time, place, and person  Sinus: - tenderness to palpation  Palate: Narrow Mallampati 4, + Tongue scalloping, + macroglossia  Lungs: Clear to auscultation bilateral, no rales  Heart: Regular rate and rhythm, no murmurs    Assessment/Plan   Michele Johnson is a 34 y.o. male who is seen to evaluate for possible obstructive sleep apnea. The pathophysiology of sleep apnea, diagnostic testing (HST vs PSG), treatment options (PAP, oral appliance, surgery, hypoglossal nerve stimulator called Inspire), and supportive management (weight loss, positional therapy, smoking cessation, avoidance of alcohol and sedatives) were discussed with the patient in detail. Risk factors of sleep apnea as well as cardiometabolic and neurocognitive sequelae associated with untreated sleep apnea were also discussed. Lastly,  patient was advised to avoid driving vehicle or operating heavy machinery when sleepy.     Michele Johnson with the following problems:     # SLEEP DISORDERED BREATHING:  -This is likely sleep apnea based on the the history and physical examination.   -Michele Johnson has not yet had a sleep study.  -Instruct patient to complete a home sleep study.  -HSAT is reasonable as patient likely has SHANIQUE based on history and exam and does not have any of the following comorbidities: CHF, neuromuscular weakness, hypoventilation, or significant COPD.  -We consider treatment as indicated when testing is complete.     # HYPERTENSION:  -Blood pressure was 161/90 today. To control the blood pressure better, instruct the patient to take anti-hypertension medication at bedtime and a water pill in the waking time.  -Denies headache, palpitation, and syncope in the clinic.  -Follows with PCP/ Cardiology     # MORBID OBESITY:  -with a BMI of 46.13. Michele Johnson most recent Bicarbonate was 29  Bicarbonate   Date Value Ref Range Status   03/07/2024 29 21 - 32 mmol/L Final   -Encourage to have regular exercise to manage weight well.  -Refer to a nutritionist for weight control.    # NASAL CONGESTION:  -Instruct Michele Tavera use appropriate Flonase spray to ease congestion.    # XEROSTOMIA:  -Instruct Michele Tavera purchase the Biotene gel to ease the dry mouth symptom,     RTC 2-3 weeks after sleep study       All of patient's questions were answered. He verbalizes understanding and agreement with my assessment and plan.

## 2024-10-24 ENCOUNTER — OFFICE VISIT (OUTPATIENT)
Dept: SLEEP MEDICINE | Facility: CLINIC | Age: 34
End: 2024-10-24
Payer: COMMERCIAL

## 2024-10-24 VITALS
SYSTOLIC BLOOD PRESSURE: 161 MMHG | OXYGEN SATURATION: 96 % | WEIGHT: 315 LBS | TEMPERATURE: 96.6 F | RESPIRATION RATE: 16 BRPM | HEIGHT: 77 IN | DIASTOLIC BLOOD PRESSURE: 90 MMHG | HEART RATE: 93 BPM | BODY MASS INDEX: 37.19 KG/M2

## 2024-10-24 DIAGNOSIS — R09.81 NASAL CONGESTION: ICD-10-CM

## 2024-10-24 DIAGNOSIS — E66.01 CLASS 3 SEVERE OBESITY DUE TO EXCESS CALORIES WITH SERIOUS COMORBIDITY AND BODY MASS INDEX (BMI) OF 45.0 TO 49.9 IN ADULT: ICD-10-CM

## 2024-10-24 DIAGNOSIS — I10 ESSENTIAL HYPERTENSION: ICD-10-CM

## 2024-10-24 DIAGNOSIS — E66.813 CLASS 3 SEVERE OBESITY DUE TO EXCESS CALORIES WITH SERIOUS COMORBIDITY AND BODY MASS INDEX (BMI) OF 45.0 TO 49.9 IN ADULT: ICD-10-CM

## 2024-10-24 DIAGNOSIS — G47.30 SLEEP-DISORDERED BREATHING: Primary | ICD-10-CM

## 2024-10-24 PROBLEM — R25.2 SPASM: Status: RESOLVED | Noted: 2023-12-05 | Resolved: 2024-10-24

## 2024-10-24 PROBLEM — U07.1 COVID-19 VIRUS INFECTION: Status: RESOLVED | Noted: 2024-04-03 | Resolved: 2024-10-24

## 2024-10-24 PROCEDURE — 99214 OFFICE O/P EST MOD 30 MIN: CPT

## 2024-10-24 PROCEDURE — 3008F BODY MASS INDEX DOCD: CPT

## 2024-10-24 PROCEDURE — 3077F SYST BP >= 140 MM HG: CPT

## 2024-10-24 PROCEDURE — 1036F TOBACCO NON-USER: CPT

## 2024-10-24 PROCEDURE — 99204 OFFICE O/P NEW MOD 45 MIN: CPT

## 2024-10-24 PROCEDURE — 3080F DIAST BP >= 90 MM HG: CPT

## 2024-10-24 ASSESSMENT — SLEEP AND FATIGUE QUESTIONNAIRES
ESS-CHAD TOTAL SCORE: 17
HOW LIKELY ARE YOU TO NOD OFF OR FALL ASLEEP WHILE WATCHING TV: HIGH CHANCE OF DOZING
HOW LIKELY ARE YOU TO NOD OFF OR FALL ASLEEP WHEN YOU ARE A PASSENGER IN A CAR FOR AN HOUR WITHOUT A BREAK: HIGH CHANCE OF DOZING
WORRIED_DISTRESSED_DUE_TO_SLEEP: MUCH
HOW LIKELY ARE YOU TO NOD OFF OR FALL ASLEEP WHILE SITTING AND TALKING TO SOMEONE: SLIGHT CHANCE OF DOZING
DIFFICULTY_STAYING_ASLEEP: SEVERE
SATISFACTION_WITH_CURRENT_SLEEP_PATTERN: DISSATISFIED
SITING INACTIVE IN A PUBLIC PLACE LIKE A CLASS ROOM OR A MOVIE THEATER: MODERATE CHANCE OF DOZING
HOW LIKELY ARE YOU TO NOD OFF OR FALL ASLEEP IN A CAR, WHILE STOPPED FOR A FEW MINUTES IN TRAFFIC: SLIGHT CHANCE OF DOZING
SLEEP_PROBLEM_INTERFERES_DAILY_ACTIVITIES: MUCH
HOW LIKELY ARE YOU TO NOD OFF OR FALL ASLEEP WHILE SITTING QUIETLY AFTER LUNCH WITHOUT ALCOHOL: MODERATE CHANCE OF DOZING
HOW LIKELY ARE YOU TO NOD OFF OR FALL ASLEEP WHILE LYING DOWN TO REST IN THE AFTERNOON WHEN CIRCUMSTANCES PERMIT: HIGH CHANCE OF DOZING
WAKING_TOO_EARLY: SEVERE
SLEEP_PROBLEM_NOTICEABLE_TO_OTHERS: MUCH
HOW LIKELY ARE YOU TO NOD OFF OR FALL ASLEEP WHILE SITTING AND READING: MODERATE CHANCE OF DOZING
DIFFICULTY_FALLING_ASLEEP: MODERATE

## 2024-10-24 NOTE — PATIENT INSTRUCTIONS
[facility]  [address]  [facility]  [address]  [facility] [address]       Thank you for coming to the Sleep Medicine Clinic today! Your sleep medicine provider today was: ROMAIN De Luna Below is a summary of your treatment plan, patient education, other important information, and our contact numbers.    Dear Mr. Michele Johnson       Your Sleep Provider Today: ROMAIN De Luna  Your Primary Care Physician: Cezar Gotti, DO   Your Referring Provider: Richardson Cardenas,       Thank you for visiting  Sleep Medicine Clinic !     1. We will proceed with a HOME sleep study to check your risk of sleep apnea. The lab will call you and schedule it for you.     2. Please do not drive when you are sleepy and start practicing the sleep hygiene as discussed in clinic.    3. Please start practicing the appropriate nasal spray at night to ease your nasal congestion as discussed in clinic today, and using Biotene to ease your dry mouth if needed.    4.Your blood pressure was elevated in the office today. Please obtain a home blood pressure monitoring kit, log your blood pressure at home, and follow up with your primary care physician. To control your blood pressure better, please take anti-hypertension medication at bedtime and a water pill at waking time.    5. FOR QUESTIONS AND CONCERNS:   a) : To schedule, cancel, or reschedule SLEEP STUDY appointments, please call [phone]  b): Please call my office with issues or questions: [phone] ([place]); [phone] ([facility]); [phone] ([facility])    If you have a CPAP or BiPAP machine at home, please bring the unit and all accessories including the power cord to your appointments unless I tell you otherwise. Please have knowledge of the DME company you worked with to receive your PAP device. If you have copies of any previous sleep testing completed outside of ,  please bring with you to clinic as well. This information will make our visits more productive.     If you are new to CPAP or BiPAP, please note the minimum usage insurance requires to continuing coverage for the equipment as noted by your DME company. Please discuss equipment issues (PAP unit, mask fit, humidification, etc.) with your DME company first.       In the event that you are running more than 10 minutes late to your appointment, I will kindly ask you to reschedule. Thanks.      TREATMENT PLAN       Referrals:  We are referring you the the following:  Nutrition     Follow-up Appointment:   Follow-up in 2-3 weeks after sleep study.    PATIENT EDUCATION     OBSTRUCTIVE SLEEP APNEA (SHANIQUE) is a sleep disorder where your upper airway muscles relax during sleep and the airway intermittently and repetitively narrows and collapses leading to partially blocked airway (hypopnea) or completely blocked airway (apnea) which, in turn, can disrupt breathing in sleep, lower oxygen levels while you sleep and cause night time wakings. Because both apnea and hypopnea may cause higher carbon dioxide or low oxygen levels, untreated SHANIQUE can lead to heart arrhythmia, elevation of blood pressure, and make it harder for the body to consolidate memory and facilitate metabolism (leading to higher blood sugars at night). Frequent partial arousals occur during sleep resulting in sleep deprivation and daytime sleepiness. SHANIQUE is associated with an increased risk of cardiovascular disease, stroke, hypertension, and insulin resistance. Moreover, untreated SHANIQUE with excessive daytime sleepiness can increase the risk of motor vehicular accidents.    Below are conservative strategies for SHANIQUE regardless of SHANIQUE severity are:   Positional therapy - Avoid sleeping on your back.   Healthy diet and regular exercise to optimize weight is highly encouraged.   Avoid alcohol late in the evening and sedative-hypnotics as these substances can make sleep  apnea worse.   Improve breathing through the nose with intranasal steroid spray, saline rinse, or antihistamines    Safety: Avoid driving vehicle and operating heavy equipment while sleepy. Drowsy driving may lead to life-threatening motor vehicle accidents. A person driving while sleepy is 5 times more likely to have an accident. If you feel sleepy, pull over and take a short power nap (sleep for less than 30 minutes). Otherwise, ask somebody to drive you.    Treatment options for sleep apnea include weight management, positional therapy, Positive Airway Therapy (PAP) therapy, oral appliance therapy, hypoglossal nerve stimulator (Inspire) and select airway surgeries.    Sleep Testing for sleep apnea: The best and ideal way to check out if you have sleep apnea is to do an overnight sleep study in the sleep laboratory. Alternatively, a home sleep apnea test can also be done depending on your insurance and risk factors.     If you are having a home sleep apnea test, kindly allot 1 hour during pickup of the testing kit as you will have to complete paperwork and listen to the sleep technician for in-person on-the-spot demonstration and instructions on how to hook up the testing kit at home. Do the test for 1 day and start off with sleeping on your back. If you sleep on your side in the middle of night or you have always been a side or stomach-sleeper, it is ok as long as you have some time on your back and off-back.     If you are having an overnight in-lab sleep study, please make sure to bring toiletries, a comfy pillow, additional warm blankets, and any nighttime medications (include as-needed inhaler, pain pill, etc) that you may regularly take. Also, be sure to eat dinner before you arrive as we generally do not provide meals inside the sleep testing center. Lastly, in order to fall asleep faster in the sleep testing center, we advise patients to wake up 2 hours earlier on the morning of scheduled testing and avoid  napping 2 days prior testing. Sometimes, your sleep provider may prescribe a sleep aid to be taken at lights out in the sleep testing center. If you are taking a sleep aid, consider having somebody pick you up after the sleep testing.    Overnight sleep studies may be scheduled on a weekday or weekend. We also perform daytime testing for shift workers on a case-by-case basis.    Once you have booked an appointment to do the sleep study, please contact my office for follow-up visit to discuss results.    On the other hand, if you have any of the following, please consider calling the sleep testing center to RESCHEDULE your sleep study appointment:  If you tested positive for COVID within 10 days of your sleep study appointment.  If you were exposed to somebody who was confirmed for COVID within 10 days of your sleep study appointment and now you are having symptoms of possible COVID  If you have fever>100F or any acute symptoms that you think will lead to poor sleep during testing (e.g. new or worsening stuffy nose not relieved by steroid nasal spray)  If you have traveled domestically or internationally in the last month and now you are having symptoms of possible COVID    How to use nasal sprays properly: If you have nasal congestion or allergies, improving your breathing through the nose is critical for treating sleep apnea and improving your sleep. Hence, intranasal steroid spray like Flonase or Nasocort (generic name is Fluticasone) might help. In some patients with sleep apnea, using intranasal steroid spray allowed them to tolerate CPAP mask better.     Intranasal steroids are usually prescribed as 2 sprays per nostril 1 hour before bedtime. If you administer intranasal steroids too close to bedtime, it might not work as well.  If you have nasal congestion or allergies during day despite using Flonase at night, try adding Azelastine nasal spray 2 sprays per nostril in the morning. Alternatively, can consider  adding over-the-counter non-sedating antihistamine medications.     However, if you have severe nasal congestion or allergies despite using both nasal sprays, consider seeing an allergy specialist to confirm which substance you are sensitive to and get definitive treatment which may be in the form of injections, oral pills, different kind of nose sprays, and/or a combination of everything said.     Below are instructions on how to use intranasal steroid spray properly:  Sit up or stand up with your face down.  Shake the spray bottle and insert its tip in one nostril.  Point the spray tip towards your ear, and not towards the middle of your nose. Pointing the tip upward and in the middle of the nose can lead to discomfort and irritation of nasal mucosa which can lead to nose bleeding or coughing up tinges of blood.  Squeeze the spray bottle and administer the recommended amount of spray.   Don't sniff when you spray. Instead, remove the spray bottle and pinch your nose for 10 seconds.   Perform steps 1-6 on the other nostril.    You can also go to the following EDUCATION WEBSITES for further information:   American Academy of Sleep Medicine http://sleepeducation.org  National Sleep Foundation: https://sleepfoundation.org  American Sleep Apnea Association: https://www.sleepapnea.org (for patients with sleep apnea)  Narcolepsy Network: https://www.narcolepsynetwork.org (for patients with narcolepsy)  [employer]: https://www.wakeupZALPcolepsy.org (for patients with narcolepsy)  Hypersomnia Foundation: https://www.hypersomniafoundation.org (for patients with idiopathic hypersomnia)  RLS foundation: https://www.rls.org (for patients with restless leg syndrome)    IMPORTANT INFORMATION     Call 911 for medical emergencies.  Our offices are generally open from Monday-Friday, 8 am - 5 pm.   There are no supporting services by either the sleep doctors or their staff on weekends and Holidays, or after 5 PM on  weekdays.   If you need to get in touch with me, you may either call my office number or you can use [employer].  If a referral for a test, for CPAP, or for another specialist was made, and you have not heard about scheduling this within a week, please call scheduling at [phone] ([phone]).  If you are unable to make your appointment for clinic or an overnight study, kindly call the office or sleep testing center at least 48 hours in advance to cancel and reschedule.  If you are on CPAP, please bring your device's card and/or the device to each clinic appointment.   In case of problems with PAP machine or mask interface, please contact your [employer] (Durable Medical Equipment) company first. DME is the company who provides you the machine and/or PAP supplies.       PRESCRIPTIONS     We require 7 days advanced notice for prescription refills. If we do not receive the request in this time, we cannot guarantee that your medication will be refilled in time.    IMPORTANT PHONE NUMBERS     Sleep Medicine Clinic Fax: [phone]  Appointments (for Pediatric Sleep Clinic): [phone] - option 1  Appointments (for Adult Sleep Clinic): [phone] - option 2  Appointments (For Sleep Studies): [phone] - option 3  Behavioral Sleep Medicine: [phone]  Sleep Surgery: [phone]  Nutrition Service: [phone]  Weight management clinics with endocrinology: [phone]  Bariatric Services: [phone] (includes weight loss medications and weight loss surgery)  [facility]: [phone] (offers holistic approaches to weight management)  ENT (Otolaryngology): [phone]  Headache Clinic (Neurology): [phone]  Neurology: [phone]  Psychiatry: [phone]  Pulmonary Function Testing (PFT) Center: [phone]  Pulmonary Medicine: [phone]  Medical Service Company ([employer]): [phone]      OUR SLEEP TESTING LOCATIONS     Our team will contact you to schedule your sleep  "study, however, you can contact us as follow:  Main Phone Line (scheduling only): [phone]), option 3  Adult and Pediatric Locations   [place] (6 years and older): [facility] - 4th floor ([address], [facility]) After hours line: [phone]  [facility] at [facility] (Main campus: All ages): [facility], 6th floor. After hours line: [phone]   [place] (5 years and older; younger considered on case-by-case basis): [address]; [address], [address]. Scheduling  After hours line: [phone]   [place] (6 years and older): [address]; [address]; [address]   [place] (6 years and older): [address] [facility] [address] [address]  After hours line: [phone]   [place] (13 years and older) in [place]: [address], 2nd floor  After hours line: [phone]   [place] (13 year and older): [address]  After hours line: [phone]     Adult Only Locations:   [place] (18 years and older): [address], 2nd floor   [place] (18 years and older): [address] [facility]; 4th floor  After hours line: [phone]   [place] (18 years and older) at [place]: [address] [facility]  After hours line: [phone]     CONTACTING YOUR SLEEP MEDICINE PROVIDER AND SLEEP TEAM      For issues with your machine or mask interface, please call your DME provider first. DME stands for durable medical company. DME is the company who provides you the machine and/or PAP supplies / accessories.   To schedule, cancel, or reschedule SLEEP STUDY APPOINTMENTS, please call the Main Phone Line at [phone] - option 3.   To schedule, cancel, or reschedule CLINIC APPOINTMENTS, you can do it in \"MyChart\", call [phone] to speak with my  (Isabelle Moraes), or call the Main Phone Line at [phone] - option 2  For CLINICAL QUESTIONS or MEDICATION REFILLS, please call direct line for " "Adult Sleep Nurses at [phone].   Lastly, you can also send a message directly to your provider through \"My Chart\", which is the email service through your  Records Account: [email or web address]       Here at [facility], we wish you a restful sleep!   "

## 2024-11-01 ENCOUNTER — PROCEDURE VISIT (OUTPATIENT)
Dept: SLEEP MEDICINE | Facility: HOSPITAL | Age: 34
End: 2024-11-01
Payer: COMMERCIAL

## 2024-11-01 DIAGNOSIS — G47.30 SLEEP-DISORDERED BREATHING: ICD-10-CM

## 2024-11-01 PROCEDURE — 95806 SLEEP STUDY UNATT&RESP EFFT: CPT | Performed by: INTERNAL MEDICINE

## 2024-11-04 ENCOUNTER — TELEPHONE (OUTPATIENT)
Dept: SLEEP MEDICINE | Facility: CLINIC | Age: 34
End: 2024-11-04
Payer: COMMERCIAL

## 2024-11-04 DIAGNOSIS — G47.30 SLEEP-DISORDERED BREATHING: Primary | ICD-10-CM

## 2024-11-04 NOTE — TELEPHONE ENCOUNTER
The patient called and stated failed the second Home Sleep Study. We will perform another in-lab sleep study to evaluate the underlying sleep issues.

## 2024-11-04 NOTE — TELEPHONE ENCOUNTER
Patient tried to do the home sleep study twice however the equipment did not record any data.  Patient contacted sleep lab and the told him since he sleeps with his mouth open he will have to do an in lab sleep study. Please advise

## 2024-11-11 ENCOUNTER — OFFICE VISIT (OUTPATIENT)
Dept: URGENT CARE | Age: 34
End: 2024-11-11
Payer: COMMERCIAL

## 2024-11-11 VITALS
TEMPERATURE: 97.4 F | SYSTOLIC BLOOD PRESSURE: 139 MMHG | HEART RATE: 85 BPM | RESPIRATION RATE: 18 BRPM | WEIGHT: 315 LBS | BODY MASS INDEX: 46.13 KG/M2 | DIASTOLIC BLOOD PRESSURE: 87 MMHG | OXYGEN SATURATION: 95 %

## 2024-11-11 DIAGNOSIS — R19.7 DIARRHEA, UNSPECIFIED TYPE: ICD-10-CM

## 2024-11-11 DIAGNOSIS — R11.2 NAUSEA AND VOMITING, UNSPECIFIED VOMITING TYPE: Primary | ICD-10-CM

## 2024-11-11 RX ORDER — ONDANSETRON 4 MG/1
4 TABLET, ORALLY DISINTEGRATING ORAL EVERY 8 HOURS PRN
Qty: 21 TABLET | Refills: 0 | Status: SHIPPED | OUTPATIENT
Start: 2024-11-11 | End: 2024-11-18

## 2024-11-11 ASSESSMENT — ENCOUNTER SYMPTOMS
PSYCHIATRIC NEGATIVE: 1
MUSCULOSKELETAL NEGATIVE: 1
HEMATOLOGIC/LYMPHATIC NEGATIVE: 1
CARDIOVASCULAR NEGATIVE: 1
ENDOCRINE NEGATIVE: 1
RESPIRATORY NEGATIVE: 1
EYES NEGATIVE: 1
CONSTITUTIONAL NEGATIVE: 1
NEUROLOGICAL NEGATIVE: 1
ROS GI COMMENTS: NAUSEA, VOMITING, DIARRHEA
ALLERGIC/IMMUNOLOGIC NEGATIVE: 1

## 2024-11-11 NOTE — PROGRESS NOTES
Subjective   Patient ID: Michele Johnsno is a 34 y.o. male. They present today with a chief complaint of Abdominal Pain (Started yesterday, N/V, soft stool, sweating).    History of Present Illness  Patient is a 35 y/o male presenting with nausea, vomiting, diarrhea x3 days. Patient reports symptoms began shortly after eating prepared chicken. Denies symptoms of ABD pain, coffee-ground emesis, black-tarry stools, inability to eat/drink, urinary urgency/frequency/burning, flank pain, hematuria, fever, chills, bodyaches, lethargy, weakness, chest pain/tightness/pressure, SOB, wheezing. No OTC medication reported to be taken.           Past Medical History  Allergies as of 11/11/2024 - Reviewed 11/11/2024   Allergen Reaction Noted    Penicillins Anaphylaxis, Fever, and Rash 05/01/2021    Amoxicillin Hives 06/30/2024    Covid-19 vaccine, [id number].cov2.s (ignacia) Unknown 11/21/2023    Covid-19 vaccine, mrna, [id number], lnp-s (moderna) Fever, Headache, Other, and Nausea Only 11/21/2023       (Not in a hospital admission)       Past Medical History:   Diagnosis Date    Acute atopic conjunctivitis, bilateral     Acute allergic conjunctivitis of both eyes    Personal history of other diseases of the nervous system and sense organs 10/06/2021    History of tension headache    Personal history of other endocrine, nutritional and metabolic disease 10/06/2021    History of obesity    Personal history of other specified conditions 10/06/2021    History of excessive sweating       Past Surgical History:   Procedure Laterality Date    OTHER SURGICAL HISTORY  10/05/2021    No history of surgery        reports that he quit smoking about 11 years ago. His smoking use included cigarettes. He started smoking about 13 years ago. He has a 0.5 pack-year smoking history. He has been exposed to tobacco smoke. He has never used smokeless tobacco. He reports that he does not currently use alcohol. He reports that he does not use drugs.    Review  of Systems  Review of Systems   Constitutional: Negative.    HENT: Negative.     Eyes: Negative.    Respiratory: Negative.     Cardiovascular: Negative.    Gastrointestinal:         Nausea, vomiting, diarrhea   Endocrine: Negative.    Genitourinary: Negative.    Musculoskeletal: Negative.    Allergic/Immunologic: Negative.    Neurological: Negative.    Hematological: Negative.    Psychiatric/Behavioral: Negative.                                    Objective    Vitals:    11/11/24 0916   BP: 139/87   Pulse: 85   Resp: 18   Temp: 36.3 °C (97.4 °F)   SpO2: 95%   Weight: (!) 176 kg (389 lb)     No LMP for male patient.    Physical Exam  Constitutional:       Comments: Patient A/O x4, LOC 5, calm and cooperative. Patient self-ambulatory to treatment area and is in no acute distress.    HENT:      Head: Normocephalic and atraumatic.      Right Ear: Tympanic membrane normal.      Left Ear: Tympanic membrane normal.      Nose: Nose normal.      Mouth/Throat:      Mouth: Mucous membranes are dry.      Pharynx: Oropharynx is clear.   Eyes:      Extraocular Movements: Extraocular movements intact.      Conjunctiva/sclera: Conjunctivae normal.      Pupils: Pupils are equal, round, and reactive to light.   Cardiovascular:      Rate and Rhythm: Normal rate and regular rhythm.      Pulses: Normal pulses.      Heart sounds: Normal heart sounds.   Pulmonary:      Effort: Pulmonary effort is normal.      Breath sounds: Normal breath sounds.   Abdominal:      Comments: Abdominal contour is flat and symmetric. No visible lesions, pulsations or peristalsis noted. Bowel sounds present and normoactive in all four quadrants. No pain with light and deep palpation. No masses palpated.    Genitourinary:     Comments: Negative CV tenderness to percussion bilaterally.   Musculoskeletal:         General: Normal range of motion.      Cervical back: Neck supple.   Skin:     General: Skin is warm and dry.      Capillary Refill: Capillary refill  takes less than 2 seconds.   Neurological:      General: No focal deficit present.      Mental Status: He is oriented to person, place, and time.   Psychiatric:         Mood and Affect: Mood normal.         Behavior: Behavior normal.         Procedures    Point of Care Test & Imaging Results from this visit  No results found for this visit on 11/11/24.   No results found.    Diagnostic study results (if any) were reviewed by ROMAIN Soto.    Assessment/Plan   Allergies, medications, history, and pertinent labs/EKGs/Imaging reviewed by ROMAIN Soto.     Medical Decision Making  Symptoms likely d/t prepared chicken or gastroenteritis. At time of discharge, patient was clinically well-appearing and appropriate for outpatient management. The patient/parent/guardian was educated regarding diagnosis, supportive care, OTC and Rx medications. The patient/parent/guardian was given the opportunity to ask questions prior to discharge. They verbalized understanding of discussion of treatment plan, expected course of illness and/or injury, indications on when to return to , when to seek further evaluation in ED/call 911, and the need to follow up with PCP and/or specialist as referred. Patient/parent/guardian was provided with work/school documentation if requested. Patient stable upon discharge.     Orders and Diagnoses  Diagnoses and all orders for this visit:  Nausea and vomiting, unspecified vomiting type  -     ondansetron ODT (Zofran-ODT) 4 mg disintegrating tablet; Take 1 tablet (4 mg) by mouth every 8 hours if needed for nausea or vomiting for up to 7 days.  Diarrhea, unspecified type      Medical Admin Record      Patient disposition: Home    Electronically signed by ROMAIN Soto  10:19 AM

## 2024-11-11 NOTE — LETTER
November 11, 2024     Patient: Michele Johnson   YOB: 1990   Date of Visit: 11/11/2024       To Whom It May Concern:    Michele Johnson was seen in my clinic on 11/11/2024 at 9:10 am. Please excuse Michele work on 11/11/24 , 11/12/24 (if needed).     If you have any questions or concerns, please don't hesitate to call.         Sincerely,         Cherry Arizmendi, CARLOS-CNP        CC: No Recipients

## 2024-11-24 NOTE — PATIENT INSTRUCTIONS
"Encouraged patient to pursue balanced eating.   Eat meals at regular intervals. Talked about strategies to work on making time at work to eat, or eating breakfast before work. Suggested that snacks would be better than missing lunch if he can't break for lunch. Eating more often can help tame appetite when he eats.   Use the Plate Method to balance the type of food and amount of foods on the plate.   At snacks, include a food that is a rich source of protein, and include a food from a second food group. Eating food from 2 food groups at a snack can promote more satisfaction than eating a snack that is very small. A snack should be satisfying and help diminish thoughts of hunger until the next meal.   Spend some time each week planning out what to eat, shop for groceries, and do a little food preparation. Try to create meals that will yield leftovers to save time at another meal. Planning can be one of the most helpful skills for promoting balanced eating, even if planning involves purchasing some convenient foods (protein bars, egg bites, etc.)    Strive to make pleasure part of eating healthy. Also strive for a good relationship with food. This shouldn't be a temporary diet that can only be maintained with strict willpower. Discussed that some \"diet\" foods might be too light to satisfy his appetite (such as egg whites) and so eating some of the regular food might be a better choice.     2. Utilize the Plate Method at meals in order to balance meals.   - 1/3-1/2 of the plate full of non-starchy vegetables. These foods contribute very little carbohydrate to the plate, and they add fiber to the meal. Salad, carrots, bell peppers, zucchini, broccoli, cauliflower, asparagus, radishes, carrots and green beans are a few examples of these foods. They can be served cooked or raw.    - 1/4-1/3 of the plate including protein foods. Examples can include meat, nuts, seeds, cheese, cottage cheese, nut butter, fish, seafood, " tofu, and edamame.     - 1/4-1/3 of the plate including carbohydrate foods. These foods include grains, fruit, legumes, starchy vegetables, milk and yogurt. Aim to eat at least half of the daily grains as whole grains.    3. Gave a recipe for pulled chicken in the crock pot, and described cooking chicken on the stovetop with salt-free chicken broth to keep it moist.     4. Pointed out up until last week he was consuming about 900 calories per day of OJ. Talked about calorie-free or low-calorie beverages to try instead.

## 2024-11-24 NOTE — PROGRESS NOTES
Nutrition Initial Assessment:     Michele Johnson is a 34 y.o. male being seen in [place] who was referred by ROMAIN De Luna on 10/24/24 for   1. Class 3 severe obesity due to excess calories with serious comorbidity and body mass index (BMI) of 45.0 to 49.9 in adult  Referral to Nutrition Services        Nutrition Assessment    Patient Active Problem List   Diagnosis    Congestion of paranasal sinus    Scalp cyst    Pain of lower extremity    Mixed hyperlipidemia    Hordeolum of right upper eyelid    Fatigue    Elevated hemoglobin A1c    Dependent edema    Essential hypertension    Gastroesophageal reflux disease    Chest wall pain    Excessive sweating    Nerve root disorder    Chronic sinusitis    Tension type headache    Acute cough    Nasal congestion    Prediabetes    Class 3 severe obesity due to excess calories with serious comorbidity and body mass index (BMI) of 45.0 to 49.9 in adult    Sleep-disordered breathing     Nutrition History:  Food & Nutrition History: Patient has been working on weight loss x 1 year but weight has been increasing during that timeframe. He's also working with a health  through work who is coaching him on portions and food choices. He has cut back drastically on fast food intake, is mostly eating chicken, egs, steak, salad, oatmeal, smoothies. Works FT [employer] and 1 day/week at  [employer]. Doesn't get a chance to eat at regular intervals at work. When he eats, appetite isn't satisfied by the little portions the health  has been suggesting.     -- Food Allergies: none; Food Intolerances: none     -- Prescription medications: Wegovy is in his med list but he's not on it, didn't have insurance coverage     -- GI Symptoms: none; Occurring >2 weeks? n/a     -- Oral Problems: other, specify: (Likes to have ranch / sauce on protein-based fods, they are dry otherwise to chew/swallow, isn't very familiar with ways to cook it and  keep it moist.); Dentition: own     -- Sleep Habits:  (referred by sleep doctor, has issue of not feeling rested during the day)     -- Physical Activity: walks 10-12K steps on the job. Uses a treadmill 2 times per week but feels like he's getting good amount of steps at work, feels physically worn out after the workday. 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 Has tried to get back into the gym on and off but is too exhausted, is addressing sleep apnea now.;      Diet Recall:     -- Meal 1: wakes up 5:45-6, starts work at 7. B at work - oatmeal, egg whites (prepped the night prior)  About 3 days of the work week misses breakfast - will try to eat 2 oatmeal packs but it leaves him hungry     -- Meal 2: L: hit or miss, sometimes no time to eat. Piece of chicken, or bagged salad with chicken on top. 1/week [employer] - double chicken, sour cream, cheese, veggies (no tortilla).     -- Meal 3: D: chicken or steak, or frozen meal like [employer] with potatoes (using these up, he's trying to get away from eating them). Switched from canned to frozen veggies. eats a veggie some nights.     -- Snacks: nuts,     -- Fluid Intake: water, keeps a bottle with him. Pepsi occasionally (if out to dinner, doesn't keep it at home). OJ - 1 gallon every 2 days. A gallon of milk lasts a week. Occasionally a Gatorade / Propel.     -- Energy Intake: Good > 75 %    Food Preparation:     -- Cooking: Patient     -- Grocery Shopping: Patient     -- Dining Out: 1 to 3 times a month (fast food <1/week, used to be daily)    Patient self identified challenges to dietary/lifestyle changes: 1) Portions 2) skipping lunch sometimes    Anthropometrics:    Weight History:   Daily Weight  11/11/24 : (!) 176 kg (389 lb)  10/24/24 : (!) 176 kg (389 lb)  03/26/24 : (!) 172 kg (380 lb)  03/06/24 : (!) 171 kg (377 lb 6.4 oz)  12/13/23 : (!) 169 kg (373 lb)  12/06/23 : (!) 167 kg (368  "lb)  11/21/23 : (!) 169 kg (372 lb 4.8 oz)  11/13/23 : 120 kg (265 lb)  10/29/22 : (!) 154 kg (340 lb)  10/16/22 : (!) 154 kg (340 lb)    Weight Change %:  Weight History / % Weight Change: CW is heaviest, previously was 295# in 2018. Reduced weight from 295 to 245# prior to the pandemic, weight climbed during the pandemic to 300#. In the past 18 months \"skyrocketed weight\".    Nutrition Focused Physical Exam Findings:  Performed/Deferred: Deferred as pt visually appears well-nourished with no signs of malnutrition    Nutrition Significant Labs:  RFP trend:   Recent Labs     03/07/24  1228 11/13/23  2122 10/06/21  1100   GLUCOSE 91 131* 103*    137 136   K 5.0 5.2 5.2    101 100   CO2 29 27 29   ANIONGAP 13 14 12   BUN 15 13 14   CREATININE 0.83 1.01 0.91   EGFR >90 >90  --    CALCIUM 9.8 9.8 9.7   ALBUMIN 4.6 4.4 4.8   , Lipid Panel trend:    Recent Labs     03/07/24  1228 10/06/21  1100   CHOL 205* 225*   HDL 33.2 33.0*   LDLCALC 113*  --    LDLF  --  135*   VLDL 58* 57*   TRIG 292* 284*   , Liver trend:   Recent Labs     03/07/24  1228 11/13/23  2122 10/06/21  1100   ALKPHOS 70 64 70   AST 23 23 30   ALT 43 51 57*   BILITOT 0.5 0.3 0.7   , Hgb A1c trend:   Recent Labs     03/07/24  1228   HGBA1C 5.9*   , Vit D: No results found for: \"VITD25\" , Iron Panel: No results found for: \"IRON\", \"TIBC\", \"FERRITIN\" , Vit B12: No results found for: \"AQZHIPGP39\" , and Folate: No results found for: \"FOLATE\"     Medications:    Current Outpatient Medications:     dexAMETHasone (Decadron) 6 mg tablet, Take 1 tablet (6 mg) by mouth once daily for 5 days., Disp: 5 tablet, Rfl: 0    fluticasone (Flonase) 50 mcg/actuation nasal spray, Administer 2 sprays into each nostril once daily., Disp: 48 mL, Rfl: 3    hydroCHLOROthiazide (HYDRODiuril) 25 mg tablet, Take 1 tablet (25 mg) by mouth once daily., Disp: 90 tablet, Rfl: 1    lisinopril 20 mg tablet, Take 1 tablet (20 mg) by mouth once daily., Disp: 90 tablet, Rfl: 1    " orphenadrine (Norflex) 100 mg 12 hr tablet, Take 1 tablet (100 mg) by mouth 2 times a day as needed for muscle spasms for up to 10 days. Do not crush, chew, or split., Disp: 20 tablet, Rfl: 0    pantoprazole (ProtoNix) 40 mg EC tablet, Take 1 tablet (40 mg) by mouth once daily. (Patient not taking: Reported on 11/11/2024), Disp: , Rfl:     semaglutide, weight loss, (Wegovy) 0.25 mg/0.5 mL pen injector, Inject 0.25 mg under the skin 1 (one) time per week for 4 doses., Disp: 2 mL, Rfl: 0    Estimated Needs:   Total Energy Estimated Needs (kCal): 2900 kCal; Method for Estimating Needs: REE x 1.4 - 1000 kcal to promote weight loss  Total Protein Estimated Needs (g): 141.45 g; Method for Estimating Needs: 0.8 g/kg, or 20% of 2900 kcal = 145 g       Nutrition Diagnosis   Malnutrition Diagnosis  Patient has Malnutrition Diagnosis: No    Nutrition Diagnosis  Patient has Nutrition Diagnosis: Yes  Diagnosis Status (1): New  Nutrition Diagnosis 1: Intake of types of carbohydrate inconsistent with needs  Related to (1): juice consumption  As Evidenced by (1): he reports consuming half a gallon of OJ per day up until last week (880 kcal from sugar per day)       Nutrition Interventions/Recommendations   Nutrition Prescription: Individualized Nutrition Prescription Provided for : decreased calorie diet, Plate Method to promote weight loss    Nutrition Interventions:   Food and Nutrient Delivery: Meals & Snacks: Protein-modified diet, Modify Composition of Meals/Snacks, Modify schedule of foods/fluids, Carbohydrate-modified diet     Coordination of Care: Collaboration and referral of nutrition care:  (N/A)     Nutrition Education:   Nutrition Education Content: Content related nutrition education    Nutrition Education Topics Discussed:   Encouraged patient to pursue balanced eating.   Eat meals at regular intervals. Talked about strategies to work on making time at work to eat, or eating breakfast before work. Suggested that  "snacks would be better than missing lunch if he can't break for lunch. Eating more often can help tame appetite when he eats.   Use the Plate Method to balance the type of food and amount of foods on the plate.   At snacks, include a food that is a rich source of protein, and include a food from a second food group. Eating food from 2 food groups at a snack can promote more satisfaction than eating a snack that is very small. A snack should be satisfying and help diminish thoughts of hunger until the next meal.   Spend some time each week planning out what to eat, shop for groceries, and do a little food preparation. Try to create meals that will yield leftovers to save time at another meal. Planning can be one of the most helpful skills for promoting balanced eating, even if planning involves purchasing some convenient foods (protein bars, egg bites, etc.)    Strive to make pleasure part of eating healthy. Also strive for a good relationship with food. This shouldn't be a temporary diet that can only be maintained with strict willpower. Discussed that some \"diet\" foods might be too light to satisfy his appetite (such as egg whites) and so eating some of the regular food might be a better choice.     2. Utilize the Plate Method at meals in order to balance meals.   - 1/3-1/2 of the plate full of non-starchy vegetables. These foods contribute very little carbohydrate to the plate, and they add fiber to the meal. Salad, carrots, bell peppers, zucchini, broccoli, cauliflower, asparagus, radishes, carrots and green beans are a few examples of these foods. They can be served cooked or raw.    - 1/4-1/3 of the plate including protein foods. Examples can include meat, nuts, seeds, cheese, cottage cheese, nut butter, fish, seafood, tofu, and edamame.     - 1/4-1/3 of the plate including carbohydrate foods. These foods include grains, fruit, legumes, starchy vegetables, milk and yogurt. Aim to eat at least half of the daily " grains as whole grains.    3. Gave a recipe for pulled chicken in the crock pot, and described cooking chicken on the stovetop with salt-free chicken broth to keep it moist.     4. Pointed out up until last week he was consuming about 900 calories per day of OJ. Talked about calorie-free or low-calorie beverages to try instead.     Educational Handouts Provided: ADA Plate Method, 33% Plate Method, High Protein Snack Ideas, and DHI Weight Loss & Metabolism    Nutrition Counseling: Strategies: Nutrition counseling based on goal setting strategy    Readiness to Change : Good  Level of Understanding : Good  Anticipated Compliant : Good         Nutrition Monitoring and Evaluation   Food/Nutrient Related History Monitoring  Monitoring and Evaluation Plan: Carbohydrate intake, Energy intake  Energy Intake: Estimated energy intake  Criteria: If he tracks calories, he will target intake <3,000 kcal/day  Estimated carbohydrate intake: Estimated carbohydrate intake  Criteria: He will continue to stop drinking OJ (or drink a small glass occasionally) - replacing it with sugar-free drinks like water, Crystal light, etc.                             Follow Up: he will call later in the year for an appointment in January (doesn't know work schedule yet)    Time Spent  Prep time on day of patient encounter: 5 minutes  Time spent directly with patient, family or caregiver: 55 minutes  Additional Time Spent on Patient Care Activities: 0 minutes  Documentation Time: 10 minutes  Other Time Spent: 0 minutes  Total: 70 minutes

## 2024-11-25 ENCOUNTER — APPOINTMENT (OUTPATIENT)
Dept: NUTRITION | Facility: CLINIC | Age: 34
End: 2024-11-25
Payer: COMMERCIAL

## 2024-11-25 VITALS — BODY MASS INDEX: 46.13 KG/M2 | HEIGHT: 77 IN

## 2024-11-25 DIAGNOSIS — E66.813 CLASS 3 SEVERE OBESITY DUE TO EXCESS CALORIES WITH SERIOUS COMORBIDITY AND BODY MASS INDEX (BMI) OF 45.0 TO 49.9 IN ADULT: Primary | ICD-10-CM

## 2024-11-25 DIAGNOSIS — E66.01 CLASS 3 SEVERE OBESITY DUE TO EXCESS CALORIES WITH SERIOUS COMORBIDITY AND BODY MASS INDEX (BMI) OF 45.0 TO 49.9 IN ADULT: Primary | ICD-10-CM

## 2025-01-14 ENCOUNTER — APPOINTMENT (OUTPATIENT)
Dept: SLEEP MEDICINE | Facility: CLINIC | Age: 35
End: 2025-01-14
Payer: COMMERCIAL

## 2025-01-26 ENCOUNTER — CLINICAL SUPPORT (OUTPATIENT)
Dept: SLEEP MEDICINE | Facility: CLINIC | Age: 35
End: 2025-01-26
Payer: COMMERCIAL

## 2025-01-26 VITALS
SYSTOLIC BLOOD PRESSURE: 130 MMHG | HEART RATE: 97 BPM | BODY MASS INDEX: 37.19 KG/M2 | OXYGEN SATURATION: 92 % | TEMPERATURE: 98.5 F | DIASTOLIC BLOOD PRESSURE: 81 MMHG | WEIGHT: 315 LBS | HEIGHT: 77 IN

## 2025-01-26 DIAGNOSIS — G47.33 OBSTRUCTIVE SLEEP APNEA (ADULT) (PEDIATRIC): ICD-10-CM

## 2025-01-26 DIAGNOSIS — G47.30 SLEEP-DISORDERED BREATHING: ICD-10-CM

## 2025-01-26 PROCEDURE — 95811 POLYSOM 6/>YRS CPAP 4/> PARM: CPT | Performed by: STUDENT IN AN ORGANIZED HEALTH CARE EDUCATION/TRAINING PROGRAM

## 2025-01-26 ASSESSMENT — SLEEP AND FATIGUE QUESTIONNAIRES
HOW LIKELY ARE YOU TO NOD OFF OR FALL ASLEEP WHILE SITTING AND TALKING TO SOMEONE: SLIGHT CHANCE OF DOZING
HOW LIKELY ARE YOU TO NOD OFF OR FALL ASLEEP WHEN YOU ARE A PASSENGER IN A CAR FOR AN HOUR WITHOUT A BREAK: MODERATE CHANCE OF DOZING
HOW LIKELY ARE YOU TO NOD OFF OR FALL ASLEEP WHILE LYING DOWN TO REST IN THE AFTERNOON WHEN CIRCUMSTANCES PERMIT: MODERATE CHANCE OF DOZING
HOW LIKELY ARE YOU TO NOD OFF OR FALL ASLEEP WHILE SITTING QUIETLY AFTER LUNCH WITHOUT ALCOHOL: SLIGHT CHANCE OF DOZING
HOW LIKELY ARE YOU TO NOD OFF OR FALL ASLEEP WHILE SITTING AND READING: MODERATE CHANCE OF DOZING
HOW LIKELY ARE YOU TO NOD OFF OR FALL ASLEEP IN A CAR, WHILE STOPPED FOR A FEW MINUTES IN TRAFFIC: SLIGHT CHANCE OF DOZING
SITING INACTIVE IN A PUBLIC PLACE LIKE A CLASS ROOM OR A MOVIE THEATER: MODERATE CHANCE OF DOZING
HOW LIKELY ARE YOU TO NOD OFF OR FALL ASLEEP WHILE WATCHING TV: HIGH CHANCE OF DOZING
ESS-CHAD TOTAL SCORE: 14

## 2025-01-27 NOTE — PROGRESS NOTES
[facility] TECH NOTE:     Patient: Michele Johnson   MRN//AGE: [id number]  1990  34 y.o.   Technologist: CHACE Muniz   Room: 3   Service Date: 2025        Sleep Testing Location: [place] [address]     [place]: 14    TECHNOLOGIST SLEEP STUDY PROCEDURE NOTE:   This sleep study is being conducted according to the policies and procedures outlined by the AAS accreditation standards.  The sleep study procedure and processes involved during this appointment was explained to the patient and all questions were answered. The patient verbalized understanding.      The patient is a 34 y.o. year old male scheduled for a Polysomnogram  with montage of:  PSG MASTER      The study that was ultimately completed was a Diagnostic PSG Split night with montage of:  PSG MASTER   PAP MASTER .    The full study Was completed.  Patient questionnaires completed?: yes     Consents signed? yes    Initial Fall Risk Screening:     Michele has not fallen in the last 6 months. Michele does not have a fear of falling. He does not need assistance with sitting, standing, or walking. he does not need assistance walking in his home. he does not need assistance in an unfamiliar setting. The patient is notusing an assistive device.     Brief Study observations: Room 3. UNSCHEDULED SPLIT STUDY. Frequent, prolonged obstructive respiratory events were observed with significant Spo2 desaturations. CPAP was initiated pursuant to departmental protocol at 0122 hours. The patient advised he was having difficulty breathing on CPAP. Tech increased CPAP pressure. Pt experienced tachypnea and frequent EEG arousals with CPAP. Tech switched to bilevel to assist with exhaling and for overall patient comfort. The patient tolerated bilevel better than CPAP. A full face mask was chosen due to mouth breathing and extensive, documented sinus issues. Pressures were increased to 19/15 to combat frequent obstructive events and loud snoring. Supine REM was observed  multiple times while on PAP therapy.    Deviation to order/protocol and reason: Unscheduled SPLIT study due to frequent obstructive respiratory events with Spo2 desaturations.     If PAP, which was preferred mask/pressure/mode: CPAP. Respironics OmniLab machine. Fitted for F&P Simplus size large. Full Face Mask.       After the procedure, the patient was informed to ensure followup with ordering clinician for testing results.      Technologist: STEPHIE MunizGT

## 2025-01-31 ENCOUNTER — OFFICE VISIT (OUTPATIENT)
Dept: URGENT CARE | Age: 35
End: 2025-01-31
Payer: COMMERCIAL

## 2025-01-31 VITALS
RESPIRATION RATE: 20 BRPM | OXYGEN SATURATION: 96 % | HEART RATE: 90 BPM | HEIGHT: 77 IN | DIASTOLIC BLOOD PRESSURE: 73 MMHG | TEMPERATURE: 97.4 F | SYSTOLIC BLOOD PRESSURE: 112 MMHG | WEIGHT: 315 LBS | BODY MASS INDEX: 37.19 KG/M2

## 2025-01-31 DIAGNOSIS — S05.02XA INJ CONJUNCTIVA AND CORNEAL ABRASION W/O FB, LEFT EYE, INIT: Primary | ICD-10-CM

## 2025-01-31 RX ORDER — MOXIFLOXACIN 5 MG/ML
1 SOLUTION/ DROPS OPHTHALMIC 3 TIMES DAILY
Qty: 3 ML | Refills: 0 | Status: SHIPPED | OUTPATIENT
Start: 2025-01-31 | End: 2025-02-07

## 2025-01-31 ASSESSMENT — ENCOUNTER SYMPTOMS
VOMITING: 0
PERI-ORBITAL EDEMA: 0
FACIAL SWELLING: 0
WEAKNESS: 0
RHINORRHEA: 0
EYE INFLAMMATION: 0
NAUSEA: 0
EYE WATERING: 1
PHOTOPHOBIA: 0
CRUSTING: 1
EYE ITCHING: 0
TINGLING: 0
EYE REDNESS: 1
DIZZINESS: 0
HEADACHES: 0
FEVER: 0
EYE DISCHARGE: 1
CHILLS: 0
LIGHT-HEADEDNESS: 0
BLIND SPOTS: 0
NUMBNESS: 0
BLURRED VISION: 0
DOUBLE VISION: 0

## 2025-01-31 ASSESSMENT — PAIN SCALES - GENERAL: PAINLEVEL_OUTOF10: 2

## 2025-01-31 ASSESSMENT — VISUAL ACUITY: OU: 1

## 2025-01-31 NOTE — PATIENT INSTRUCTIONS
Homegoing Instructions for Corneal Abrasion    Patient: 34-year-old with a corneal abrasion, prescribed antibiotic eye drops.    Care Plan:  Use antibiotic eye drops as prescribed to prevent infection.  Avoid rubbing or touching the eye to prevent further irritation.  Wear sunglasses to reduce light sensitivity and discomfort.  Rest the eye by limiting screen time and reading.  Do not wear contact lenses until cleared by the ophthalmologist.  Use artificial tears if needed for comfort (preservative-free recommended).  Follow-Up:  Follow up with an ophthalmologist as directed to ensure proper healing.  When to Seek Emergency Care:  Worsening pain, redness, or swelling.  Decreased vision or new vision changes.  Pus or thick discharge from the eye.  Increased sensitivity to light or worsening discomfort.

## 2025-01-31 NOTE — PROGRESS NOTES
"Subjective   Patient ID: Michele Johnson is a 34 y.o. male. They present today with a chief complaint of Eye Problem.    History of Present Illness  34 year old male presents with complaint or left eye discomfort, watering, crusting. Sx started 2 days ago. Reports he was vacuuming up rock salt with is shop vac and while he was emptying the contents of the vac into the garbage some dust flew into his eyes. He then rubbed his eye to free the debris. The next day woke with his L eye \"crusted shut\" and eye irritation. Used some Visine which worsened the eye discomfort. Denies photophobia, dizziness, HA, fever, floaters, eye pain. Does not wear contact lenses.       Eye Problem  Location:  Left eye  Quality:  Tearing, burning and foreign body sensation  Severity:  Moderate  Onset quality:  Sudden  Duration:  2 days  Timing:  Constant  Progression:  Waxing and waning  Chronicity:  New  Context: foreign body    Foreign body:  Dirt  Relieved by:  Flushing  Exacerbated by: visine eye drops.  Ineffective treatments:  Closing eye, eye drops and flushing  Associated symptoms: crusting, discharge, redness and tearing    Associated symptoms: no blurred vision, no decreased vision, no double vision, no facial rash, no headaches, no inflammation, no itching, no nausea, no numbness, no photophobia, no scotomas, no swelling, no tingling, no vomiting and no weakness        Past Medical History  Allergies as of 01/31/2025 - Reviewed 01/31/2025   Allergen Reaction Noted    Penicillins Anaphylaxis, Fever, and Rash 05/01/2021    Amoxicillin Hives 06/30/2024    Covid-19 vaccine, ad26.cov2.s ([facility]) Unknown 11/21/2023    Covid-19 vaccine, mrna, [id number], lnp-s (moderna) Fever, Headache, Other, and Nausea Only 11/21/2023       (Not in a hospital admission)       Past Medical History:   Diagnosis Date    Acute atopic conjunctivitis, bilateral     Acute allergic conjunctivitis of both eyes    Personal history of other diseases of the " "nervous system and sense organs 10/06/2021    History of tension headache    Personal history of other endocrine, nutritional and metabolic disease 10/06/2021    History of obesity    Personal history of other specified conditions 10/06/2021    History of excessive sweating       Past Surgical History:   Procedure Laterality Date    OTHER SURGICAL HISTORY  10/05/2021    No history of surgery        reports that he quit smoking about 12 years ago. His smoking use included cigarettes. He started smoking about 14 years ago. He has a 0.5 pack-year smoking history. He has been exposed to tobacco smoke. He has never used smokeless tobacco. He reports that he does not currently use alcohol. He reports that he does not use drugs.    Review of Systems  Review of Systems   Constitutional:  Negative for chills and fever.   HENT:  Negative for congestion, facial swelling and rhinorrhea.    Eyes:  Positive for discharge and redness. Negative for blurred vision, double vision, photophobia and itching.   Cardiovascular:  Negative for chest pain.   Gastrointestinal:  Negative for nausea and vomiting.   Neurological:  Negative for dizziness, tingling, weakness, light-headedness, numbness and headaches.   All other systems reviewed and are negative.          Objective    Vitals:    01/31/25 1643   BP: 112/73   BP Location: Right arm   Patient Position: Sitting   BP Cuff Size: Large adult   Pulse: 90   Resp: 20   Temp: 36.3 °C (97.4 °F)   TempSrc: Oral   SpO2: 96%   Weight: (!) 179 kg (394 lb)   Height: 1.956 m (6' 5\")     No LMP for male patient.    Physical Exam  Vitals and nursing note reviewed.   Constitutional:       General: He is not in acute distress.     Appearance: Normal appearance. He is not ill-appearing.   HENT:      Right Ear: Tympanic membrane, ear canal and external ear normal.      Left Ear: Tympanic membrane, ear canal and external ear normal.      Nose: Nose normal.      Mouth/Throat:      Mouth: Mucous membranes " are moist.   Eyes:      General: Lids are normal. Lids are everted, no foreign bodies appreciated. Vision grossly intact. Gaze aligned appropriately. No visual field deficit.        Left eye: No foreign body, discharge or hordeolum.      Extraocular Movements: Extraocular movements intact.      Conjunctiva/sclera:      Left eye: Left conjunctiva is injected.      Pupils: Pupils are equal, round, and reactive to light.      Comments: sterile fluorescein strip was moistened with sterile saline and applied to the conjunctival sac of the left eye.  Examination was performed using a Wood's lamp/UV light  A 0.5 cm corneal abrasion was observed at the 6 o'clock position, directly below the pupil.  No evidence of foreign body, corneal ulceration, or significant discharge noted.  No signs of Elizabeth's sign    Cardiovascular:      Rate and Rhythm: Normal rate and regular rhythm.      Pulses: Normal pulses.      Heart sounds: Normal heart sounds. No murmur heard.  Pulmonary:      Effort: Pulmonary effort is normal. No respiratory distress.      Breath sounds: Normal breath sounds.   Musculoskeletal:         General: Normal range of motion.      Cervical back: Normal range of motion and neck supple. No tenderness.   Lymphadenopathy:      Cervical: No cervical adenopathy.   Skin:     General: Skin is warm and dry.   Neurological:      Mental Status: He is alert and oriented to person, place, and time.   Psychiatric:         Mood and Affect: Mood normal.         Behavior: Behavior normal.         Procedures    Point of Care Test & Imaging Results from this visit  No results found for this visit on 01/31/25.   No results found.    Diagnostic study results (if any) were reviewed by ROMAIN Adkins.    Assessment/Plan   Allergies, medications, history, and pertinent labs/EKGs/Imaging reviewed by ROMAIN Adkins.     Medical Decision Making  Signs and symptoms consistent with corneal abrasion. No evidence of  corneal foreign body or infection of the eye. Will treat with antibiotic drops prophylactically. Follow with PCP/ophthalmology within 3-5 days for recheck. Return to clinic or go to ED if symptoms change or worsen.  Patient verbalized understanding and agreeable with plan of care.      Orders and Diagnoses  There are no diagnoses linked to this encounter.    Medical Admin Record      Patient disposition: Home    Electronically signed by ROMAIN Adkins  5:16 PM

## 2025-02-04 ENCOUNTER — OFFICE VISIT (OUTPATIENT)
Dept: SLEEP MEDICINE | Facility: CLINIC | Age: 35
End: 2025-02-04
Payer: COMMERCIAL

## 2025-02-04 VITALS
BODY MASS INDEX: 37.19 KG/M2 | SYSTOLIC BLOOD PRESSURE: 135 MMHG | TEMPERATURE: 97.4 F | HEIGHT: 77 IN | HEART RATE: 93 BPM | DIASTOLIC BLOOD PRESSURE: 89 MMHG | RESPIRATION RATE: 16 BRPM | WEIGHT: 315 LBS | OXYGEN SATURATION: 95 %

## 2025-02-04 DIAGNOSIS — R09.81 NASAL CONGESTION: ICD-10-CM

## 2025-02-04 DIAGNOSIS — K21.9 GASTROESOPHAGEAL REFLUX DISEASE, UNSPECIFIED WHETHER ESOPHAGITIS PRESENT: ICD-10-CM

## 2025-02-04 DIAGNOSIS — G47.33 OBSTRUCTIVE SLEEP APNEA (ADULT) (PEDIATRIC): ICD-10-CM

## 2025-02-04 DIAGNOSIS — E66.01 CLASS 3 SEVERE OBESITY DUE TO EXCESS CALORIES WITH SERIOUS COMORBIDITY AND BODY MASS INDEX (BMI) OF 45.0 TO 49.9 IN ADULT: ICD-10-CM

## 2025-02-04 DIAGNOSIS — G47.33 OSA (OBSTRUCTIVE SLEEP APNEA): Primary | ICD-10-CM

## 2025-02-04 DIAGNOSIS — E66.813 CLASS 3 SEVERE OBESITY DUE TO EXCESS CALORIES WITH SERIOUS COMORBIDITY AND BODY MASS INDEX (BMI) OF 45.0 TO 49.9 IN ADULT: ICD-10-CM

## 2025-02-04 DIAGNOSIS — I10 ESSENTIAL HYPERTENSION: ICD-10-CM

## 2025-02-04 PROBLEM — G47.30 SLEEP-DISORDERED BREATHING: Status: RESOLVED | Noted: 2024-10-24 | Resolved: 2025-02-04

## 2025-02-04 PROBLEM — R61 EXCESSIVE SWEATING: Status: RESOLVED | Noted: 2023-12-05 | Resolved: 2025-02-04

## 2025-02-04 PROCEDURE — 99213 OFFICE O/P EST LOW 20 MIN: CPT

## 2025-02-04 PROCEDURE — 3075F SYST BP GE 130 - 139MM HG: CPT

## 2025-02-04 PROCEDURE — 3008F BODY MASS INDEX DOCD: CPT

## 2025-02-04 PROCEDURE — 1036F TOBACCO NON-USER: CPT

## 2025-02-04 PROCEDURE — 3079F DIAST BP 80-89 MM HG: CPT

## 2025-02-04 ASSESSMENT — SLEEP AND FATIGUE QUESTIONNAIRES
HOW LIKELY ARE YOU TO NOD OFF OR FALL ASLEEP WHILE SITTING QUIETLY AFTER LUNCH WITHOUT ALCOHOL: SLIGHT CHANCE OF DOZING
SITING INACTIVE IN A PUBLIC PLACE LIKE A CLASS ROOM OR A MOVIE THEATER: SLIGHT CHANCE OF DOZING
HOW LIKELY ARE YOU TO NOD OFF OR FALL ASLEEP WHILE SITTING AND TALKING TO SOMEONE: SLIGHT CHANCE OF DOZING
HOW LIKELY ARE YOU TO NOD OFF OR FALL ASLEEP WHEN YOU ARE A PASSENGER IN A CAR FOR AN HOUR WITHOUT A BREAK: MODERATE CHANCE OF DOZING
HOW LIKELY ARE YOU TO NOD OFF OR FALL ASLEEP WHILE WATCHING TV: HIGH CHANCE OF DOZING
HOW LIKELY ARE YOU TO NOD OFF OR FALL ASLEEP WHILE SITTING AND READING: HIGH CHANCE OF DOZING
ESS-CHAD TOTAL SCORE: 15
HOW LIKELY ARE YOU TO NOD OFF OR FALL ASLEEP IN A CAR, WHILE STOPPED FOR A FEW MINUTES IN TRAFFIC: SLIGHT CHANCE OF DOZING
HOW LIKELY ARE YOU TO NOD OFF OR FALL ASLEEP WHILE LYING DOWN TO REST IN THE AFTERNOON WHEN CIRCUMSTANCES PERMIT: HIGH CHANCE OF DOZING

## 2025-02-04 NOTE — PROGRESS NOTES
[facility]  [address]  [facility]  [address]  [facility] [id number]     [facility]  Follow Up Visit Note          Subjective  Patient ID: Michele Johnson is a 34 y.o. male with past medical history significant for Morbid Obesity, Hypertension, Nasal Congestion, Gastroesophageal reflux disease, and OBSTRUCTIVE SLEEP APNEA.    2/4/25: UPDATED: The patient returned to the clinic to review his sleep study and treat his severe sleep apnea. The desensitization strategy, 30-day free mask return policy, and insurance requirements are all discussed with the patient. The patient verbalized understanding it. He reports feeling comfortable with his mask in the clinic, and I will order a Simplus mask for him.  His ESS is 15  today.      10/24/2024: The patient is here alone today and was referred by Richardson Cardenas DO, for comprehensive sleep medicine evaluation due to suspected sleep apnea, excessive daytime sleepiness/fatigue, difficulty staying asleep and sleep talking, snoring, witnessed apnea, waking up gasping, nasal congestion, morning headache, morning dry mouth irritability in daytime, drowsy driving, mouth breathing, night sweats during sleep, and nocturnal cough, and nocturia. His ESS is 17, VIRGIL is 20, and neck circumference is 20 inches today.        HPI  Patient had been having these symptoms for the past 2-3 years. Patient never had sleep study done yet.         SLEEP STUDY HISTORY: (personally reviewed raw data such as interpretation report, data sheet, hypnogram, and titration table if available and applicable)  1/26/2025 : The RDI3% was 122.0/hr, the RDI4% was 112.2/hr and CARLOS was 0.4.  Based on RDI3%, the breathing pattern demonstrated severe sleep disordered breathing characterized predominantly by obstructive events. The mean oxygen saturation was 93.0% during wake, and 89.0% during sleep. The  oxygen saturation was <=88% for 60.9 minutes, and the SpO2 radha during sleep was 67.0%. Due to the severity of sleep apnea, PAP therapy was initiated for the remainder of the night. Consider initiation of auto-adjusting Bilevel PAP at the following settings: Max IPAP of 18, Min EPAP of 7 and pressure support of 5 cm H2O with heated humidification with EPR/Flex of 1-2.      SLEEP-WAKE SCHEDULE  Bedtime: 10-11 PM on weekdays, 10:30-11:30 PM on weekends  Subjective sleep latency: 15-20 minutes  Problems falling asleep: No  Number of awakenings: 2-3 times per night spontaneously for BR  Falls back asleep in 5 minutes  Problems staying asleep: Yes  Final wake time: 6 AM on weekdays, 9-10 AM on weekends  Out of bed time: 6 AM on weekdays, 10 AM on weekends  Shift work: 1st shift  Naps: Yes, 2-3 hours  Feels rested after a nap: Yes   Average sleep duration (excluding naps): 5-6 hours     SLEEP ENVIRONMENT  Sleep location: bed  Sleep status: sleeps alone  Room is dark:  Yes  Room is quiet: Yes  Room is cool: Yes  Bed comfort: good     SLEEP HABITS:   Activities before bedtime: watch TV  Activities in bed:  no  Preferred sleep position: stomach and side     SLEEP ROS:  Night symptoms: POSITIVE for snoring, witnessed apnea, wake up gasping and/or choking for air, nasal congestion , mouth breathing, night sweats during sleep, and nocturnal cough  Morning symptoms: POSITIVE for unrefreshing sleep, morning headache, and morning dry mouth  Daytime symptoms POSITIVE for excessive daytime sleepiness, fatigue, irritability in daytime, and drowsy driving  Hypersomnia / narcolepsy symptoms: Patient denies symptoms of a hypersomnolence disorder such as sleep paralysis, sleep-related hallucinations, and cataplexy.   RLS symptoms: Patient denies RLS symptoms.  Movements in sleep: Patient denies problematic movements in sleep such as seizures during sleep, frequent leg kicks / jerks while asleep, sleep-related bruxism, and waking up with  bedsheets in disarray.  Parasomnia symptoms: Patient denies symptoms of parasomnia such as sleepwalking, sleeptalking, sleep-eating, acting out dreams, and nightmares.      WEIGHT: stable     REVIEW OF SYSTEMS: All other systems have been reviewed and are negative.     PERTINENT SOCIAL HISTORY:  Occupation: law enforcement  Smoking: No   ETOH: No   Marijuana: No   Caffeine: No  Sleep aids: No   Claustrophobia: Yes      PERTINENT PAST SURGICAL HISTORY:  non-contributory     PERTINENT FAMILY HISTORY:  sleep apnea- mother     Active Problems, Allergy List, Medication List, and PMH/PSH/FH/Social Hx have been reviewed and reconciled in chart. No significant changes unless documented in the pertinent chart section. Updates made when necessary.         Objective  Vitals:    02/04/25 1535   BP: 135/89   Pulse: 93   Resp: 16   Temp: 36.3 °C (97.4 °F)   SpO2: 95%      REVIEW OF SYSTEMS  All other systems have been reviewed and are negative.     ALLERGIES  Allergies        Allergies   Allergen Reactions    Penicillins Anaphylaxis, Fever and Rash    Amoxicillin Hives    Covid-19 Vaccine, Ad26.Cov2.S ([facility]) Unknown    Covid-19 Vaccine, Mrna, [id number], Lnp-S (Moderna) Fever, Headache, Other and Nausea Only            MEDICATIONS  Current Medications          Current Outpatient Medications   Medication Sig Dispense Refill    dexAMETHasone (Decadron) 6 mg tablet Take 1 tablet (6 mg) by mouth once daily for 5 days. 5 tablet 0    fluticasone (Flonase) 50 mcg/actuation nasal spray Administer 2 sprays into each nostril once daily. 48 mL 3    hydroCHLOROthiazide (HYDRODiuril) 25 mg tablet Take 1 tablet (25 mg) by mouth once daily. 90 tablet 1    lisinopril 20 mg tablet Take 1 tablet (20 mg) by mouth once daily. 90 tablet 1    orphenadrine (Norflex) 100 mg 12 hr tablet Take 1 tablet (100 mg) by mouth 2 times a day as needed for muscle spasms for up to 10 days. Do not crush, chew, or split. 20 tablet 0    pantoprazole (ProtoNix) 40 mg  EC tablet Take 1 tablet (40 mg) by mouth once daily.        semaglutide, weight loss, (Wegovy) 0.25 mg/0.5 mL pen injector Inject 0.25 mg under the skin 1 (one) time per week for 4 doses. 2 mL 0      No current facility-administered medications for this visit.              Physical Exam  Constitutional:alert and oriented to time, place, and person  Lungs: Clear to auscultation bilateral, no rales  Heart: Regular rate and rhythm, no murmurs      Assessment/Plan  Michele Johnson is a 34 y.o. male who is seen for SEVERE obstructive sleep apnea. Risk factors of sleep apnea as well as cardiometabolic and neurocognitive sequelae associated with untreated sleep apnea were also discussed. Lastly, patient was advised to avoid driving vehicle or operating heavy machinery when sleepy.      Michele Johnson with the following problems:     # OBSTRUCTIVE SLEEP APNEA:  -Start auto Bilevel PAP at Max IPAP of 18, Min EPAP of 7 , PS : 5 cm H2O with EPR/Flex of 1-2 with F&P [employer] through [employer].  -Sleep apnea and PAP therapy education were provided at length in the clinic today. Michele HARRIS Alex verbalized understanding.  -Emphasized diet, exercise, and weight loss in the clinic, as were non-supine sleep, avoiding alcohol in the late evening, and driving or operating heavy machinery when sleepy.  Michele HARRIS Alexverbalizes understanding of the above instructions and risks.     # HYPERTENSION:  -Blood pressure was 135/89 today. To control the blood pressure better, instruct the patient to take anti-hypertension medication at bedtime and a water pill in the waking time.  -Denies headache, palpitation, and syncope in the clinic.  -Follows with PCP/ Cardiology     # MORBID OBESITY:  -with a BMI of 46.72  Michele Johnson most recent Bicarbonate was 29        Bicarbonate   Date Value Ref Range Status   03/07/2024 29 21 - 32 mmol/L Final   -Encourage to have regular exercise to manage weight well.  -Refer to a  nutritionist for weight control.     # NASAL CONGESTION:  -Instruct Michele Tavera use appropriate Flonase spray to ease congestion.     # XEROSTOMIA:  -Instruct Michele Tavera purchase the Biotene gel to ease the dry mouth symptom,     RTC 1 month after receiving BPAP         All of patient's questions were answered. He verbalizes understanding and agreement with my assessment and plan.

## 2025-02-04 NOTE — PATIENT INSTRUCTIONS
[facility] Sleep Medicine  [address]  [facility]  [address]  [facility] [address]       Thank you for coming to the Sleep Medicine Clinic today! Your sleep medicine provider today was: ROMAIN De Luna Below is a summary of your treatment plan, patient education, other important information, and our contact numbers.    Dear Mr. Michele Johnson       Your Sleep Provider Today: ROMAIN De Luna  Your Primary Care Physician: Cezar Gotti DO     Diagnosis: OBSTRUCTIVE SLEEP APNEA       Thank you for visiting  Sleep Medicine Clinic !     1. According to your symptom and sleep study report. I will order the new PAP device for you to control your sleep apnea, feel free to contact your DME.   If [employer] is your DME, you can reach them at [phone].   2. Please do not drive when you are sleepy and continue practicing the sleep hygiene as discussed in clinic.    3. Your blood pressure was elevated in the office today. Please obtain a home blood pressure monitoring kit, log your blood pressure at home, and follow up with your primary care physician. To control your blood pressure better, please take anti-hypertension medication at bedtime and a water pill at waking time.    4. FOR QUESTIONS AND CONCERNS:   a) : In case of problems with machine or mask interface, please contact your DME company first. [employer] is the company who provides you the machine and/or PAP supplies / accessories. If [employer] is your DME, you can reach them at [phone].   b): Please call my office with issues or questions: [phone] ([place]); [phone] ([facility]); [phone] ([facility])    If you have a CPAP or BiPAP machine at home, please bring the unit and all accessories including the power cord to your appointments unless I tell you otherwise. Please have knowledge of the DME company you worked with to receive your  "PAP device. If you have copies of any previous sleep testing completed outside of , please bring with you to clinic as well. This information will make our visits more productive.     If you are new to CPAP or BiPAP, please note the minimum usage insurance requires to continuing coverage for the equipment as noted by your DME company. Please discuss equipment issues (PAP unit, mask fit, humidification, etc.) with your DME company first.       In the event that you are running more than 10 minutes late to your appointment, I will kindly ask you to reschedule. Thanks.      TREATMENT PLAN     - Start auto-CPAP. Order placed and sent to [employer].  - Please read the \"Patient Education\" section below for more detailed information. Try implementing tips, reminders, strategies, and supportive management.   - If not yet done, please sign up for [employer] to make a future schedule, send prescription requests, or send messages.    Follow-up Appointment:   Follow-up in 31-90 days after getting new machine.    PATIENT EDUCATION     OBSTRUCTIVE SLEEP APNEA (SHANIQUE) is a sleep disorder where your upper airway muscles relax during sleep and the airway intermittently and repetitively narrows and collapses leading to partially blocked airway (hypopnea) or completely blocked airway (apnea) which, in turn, can disrupt breathing in sleep, lower oxygen levels while you sleep and cause night time wakings. Because both apnea and hypopnea may cause higher carbon dioxide or low oxygen levels, untreated SHANIQUE can lead to heart arrhythmia, elevation of blood pressure, and make it harder for the body to consolidate memory and facilitate metabolism (leading to higher blood sugars at night). Frequent partial arousals occur during sleep resulting in sleep deprivation and daytime sleepiness. SHANIQUE is associated with an increased risk of cardiovascular disease, stroke, hypertension, and insulin resistance. Moreover, untreated SHANIQUE with " excessive daytime sleepiness can increase the risk of motor vehicular accidents.    Below are conservative strategies for SHANIQUE regardless of SHANIQUE severity are:   Positional therapy - Avoid sleeping on your back.   Healthy diet and regular exercise to optimize weight is highly encouraged.   Avoid alcohol late in the evening and sedative-hypnotics as these substances can make sleep apnea worse.   Improve breathing through the nose with intranasal steroid spray, saline rinse, or antihistamines    Safety: Avoid driving vehicle and operating heavy equipment while sleepy. Drowsy driving may lead to life-threatening motor vehicle accidents. A person driving while sleepy is 5 times more likely to have an accident. If you feel sleepy, pull over and take a short power nap (sleep for less than 30 minutes). Otherwise, ask somebody to drive you.    Treatment options for sleep apnea include weight management, positional therapy, Positive Airway Therapy (PAP) therapy, oral appliance therapy, hypoglossal nerve stimulator (Inspire) and select airway surgeries.    Starting Positive Airway Pressure (PAP): You were ordered a device to wear when you sleep called PAP (Positive Airway Pressure) to treat your sleep apnea. The order will be submitted to a durable medical equipment (DME) company who will arrange setting you up with the device. They will provide all the necessary equipment and discuss use and maintenance of the device with you as well as mask fitting and process of replacing / renewing PAP supplies or accessories. Once you get the machine, please start using it immediately. You may not be successful right away and that is okay. [place] be certain that you keep trying nightly and reach out to DME if you are struggling or having issues with machine usage.     *Please follow-up with me in 1-2 months of starting CPAP to see how well it is working for you and to do some troubleshooting if needed. Also, please bring all PAP equipment  with you to follow up appointments unless told otherwise.     Important things to keep in mind as you start PAP:  Insurance will monitor your usage during the first 90 days. You should use your PAP - all night, every night, and including all naps (especially if naps are more than 30 minutes) for your health. The bare minimum is to use your PAP device while sleeping for at least 4 hours per day at least 5-6 days per week.. Otherwise, your PAP device will be reclaimed by your DME company at 90 days.  There are many masks to choose from to wear with your PAP machine. If you are not comfortable with the first mask issued to you, call your DME company and ask for another option to try. You typically have a 30-day mask guarantee from the day you received your machine.   Discuss with your provider if you are having issues breathing with the machine or if the temperature or humidity feel uncomfortable.  Expect to have an adjustment period when you start your device. It helps to continuing wearing the machine every day for a period of time until you get more used to it. You can practice with wearing the mask alone if you need, then add in the PAP air pressure a few days later.   Reach out for help if you are struggling! The sleep medicine department can be reached at [phone]  We encourage you to download data monitoring apps to your phone. For [employer] 10/11 - MyAir [employer]choa. For [employer] - DreamMapper. Both apps are available in the [employer] store for free and are a great tool to monitor your progress with your PAP device night to night.    Tips for success with PAP machine usage:  Comfortable and well-fitting mask  Appropriate pressure on the machine  Using humidification  Support from bed partner and clinical team      Maintaining your CPAP/BPAP device:    The humidification chamber (aka water tank or water chamber) needs to be filled with distilled water to prevent buildup of white deposits in the  future. If you cannot find distilled water, you can use tap water but expect to have white deposits buildup seen after prolonged use with tap water. If you start seeing white deposits on the water chamber, you can clean it by filling it with equal parts of distilled white vinegar and water. Let the vinegar-water mixture sit for 2 hours, and then rinse it with running tap water. Clean with soap and water then let it dry.     You should try to keep your machine clean in order to work well. Here are some tips to clean PAP supplies / accessories:    Clean the humidification chamber (aka water tank) as well as your mask and tubing at least once a week with soap and water.   Alternatively, you can fill a sink or basin with warm water and add a little mild detergent, like Ivory dish soap. Gently wipe your supplies with the soapy water to free all the oils and dirt that may have collected. Once that's done, rinse these items with clean water until the soap is gone and let them air dry. You can hang your tubing over the curtain manuel in your bathroom so that it dries.  The mask insert (part of the mask that has contact with your skin) needs to be cleaned with soap and water daily. Another option is to wipe them down with CPAP wipes or baby wipes.    You should replace your mask and tubing frequently in order to prevent bacteria buildup, machine damage, and mask seal issues. The older the mask and hose, the high likelihood that there is bacteria buildup in it especially if they are not cleaned regularly. Dirty filters damage machines because build-up of dust and contaminants can cause machine to over-heat, and in time, damage the motor of machine. Cushions lose their seal over time as most masks are made of plastic and silicone while headgear is made of neoprene. These materials will break down with age and frequent use. Here is the recommended replacement schedule for PAP supplies / accessories:    Twice a month- disposable  filters and cushions for nasal mask or nasal pillows.  Once a month- cushion for full face mask  Every 3 months- mask with headgear and PAP tubing (standard or heated hose)  Every 6 months- reusable filter, water chamber, and chin strap     Other useful information:    Some people do not put water in the tank while other people prefers to put water in the tank to prevent mouth dryness. Try to experiment to determine which is more comfortable for you.   In general, new machines have 2 years warranty on parts while health insurance allows you to have a new machine once every 5 years.     Common issues with PAP machine:    Mask gets dislodged when turning to the side: Consider getting a CPAP pillow or switching to a mask with hose on top.     Dry mouth:  Your machine has built-in humidifier that heats up the air to prevent dry mouth. It can be adjusted to your comfort. You can try that first and increase setting one level one night at a time to check which setting is comfortable and effective in lessening dry mouth. In some patients with heated hose, adjusting tube temperature to make air warmer can improve dry mouth. If dry mouth persists despite adjusting humidity or tube temperature setting, may apply OTC Biotene gel over the gums at bedtime.  If Biotene gel is not effective, consider trying XEROSTOM gel from [employer].  Also, eliminate or reduce dose of medications that can cause dry mouth if possible. Lastly, may try getting a separate room humidifier machine.    Airleaks: Please call DME as they may need to adjust your mask or refit you with a different kind or different size of mask. In addition, you can ask DME for tips on getting a good mask seal and mask fit.     Difficulty tolerating the mask: Contact your DME to try a different kind of mask and/or call office to get a referral to Sleep Psychologist for CPAP desensitization. CPAP desensitization technique is a set of strategies that helps patient cope with  "claustrophobia and anxiety related to wearing mask. Alternatively, we can do a daytime mini-sleep study called PAP-nap trial wherein you will try on different kinds of mask and the sleep technician will try different pressure settings on CPAP and BPAP machines to see which specific pressure is tolerable and comfortable for you.     Water droplets or moisture within the hose and/or mask: This is called rain-out and it is caused by condensation of too much heated humidity on the cooler walls of the hose. If you have rain-out, turn down humidity settings or get a heated hose. If you already have a heated hose, turn up the \"tube temperature\" of the heated hose. Alternatively, if you don't want to get a heated hose or warmer air, may wrap the CPAP hose with stockings to keep it somewhat warm. Also, you need to place the machine on the floor and lower the hose so that water won't travel upward towards your mask.     PAP desensitization techniques: If you have concerns about something being on your face at night, you can start by getting used to it before trying to sleep with it as follows:      Sit in a comfortable chair or bed. Connect the mask and hose to the CPAP/BPAP machine. Hold the mask on your face (without straps on) and turn on the machine. Practice breathing with the mask on while awake sitting and watching television, reading, or performing a sedentary activity during the day for 5-10 minutes and then take it off.  If tolerated, try again and gradually build up to longer periods of time. If not tolerated, try and try again until it is more comfortable as you become more desensitized. If you are able to use it for at least 20-30 minutes, move unto the next step.     Sit in a comfortable chair or bed. Connect the mask and hose to the CPAP/BPAP machine. Strap the mask on your head and turn on the machine. Practice breathing with the mask and headgear on while awake sitting and watching television, reading, or " performing a sedentary activity. Start with 5-10 minutes and gradually increasing time until you can wear it comfortably for at least 20-30 minutes, then move to the next step.    Take a shorter daytime nap with machine turned on while you are in a reclined position in bed, sofa, or recliner. Start with 5-10 minute nap and gradually increase up to 30 minutes. It is not important whether you fall asleep or not. The goal is to rest comfortably with PAP machine on.     Reintroduce PAP machine into nighttime sleep. You can begin using it a portion of the night and gradually increase up to entire night.     Proceed from one step to the next only when you are completely comfortable. If you feel any anxiety or discomfort, return to the previous step, then proceed again when comfortable.    Expect to “work” with your CPAP/BIPAP unit. It is important to try to relax when beginning CPAP/BIPAP therapy. Inhalation and exhalation should occur through the nose only. If you are unable to consistently breathe this way, do not panic or lose hope. There are other types of masks which allow you to breathe through your nose and/or your mouth. Also, in some patients, using intranasal steroid spray (e.g. Flonase or Nasocort or Fluticasone) 1 hour before bedtime and/or before putting on CPAP mask can help tolerate breathing through the mask.    Don't give up after a few attempts--some patients adjust quickly, while some patients need 3-4 weeks (or sometimes even longer) to be accustomed to CPAP therapy.  Contact your sleep medicine specialist if you have a significant change in weight since this may affect your pressure.    You can also go to the following EDUCATION WEBSITES for further information:   American Academy of Sleep Medicine http://sleepeducation.org  National Sleep Foundation: https://sleepfoundation.org  American Sleep Apnea Association: https://www.sleepapnea.org (for patients with sleep apnea)  Narcolepsy Network:  https://www.narcolepsynetwork.org (for patients with narcolepsy)  [employer]: https://www.wakeupnarcolepsy.org (for patients with narcolepsy)  Hypersomnia Foundation: https://www.hypersomniafoundation.org (for patients with idiopathic hypersomnia)  RLS foundation: https://www.rls.org (for patients with restless leg syndrome)    IMPORTANT INFORMATION     Call 911 for medical emergencies.  Our offices are generally open from Monday-Friday, 8 am - 5 pm.   There are no supporting services by either the sleep doctors or their staff on weekends and Holidays, or after 5 PM on weekdays.   If you need to get in touch with me, you may either call my office number or you can use [employer].  If a referral for a test, for CPAP, or for another specialist was made, and you have not heard about scheduling this within a week, please call scheduling at [phone] ([phone]).  If you are unable to make your appointment for clinic or an overnight study, kindly call the office or sleep testing center at least 48 hours in advance to cancel and reschedule.  If you are on CPAP, please bring your device's card and/or the device to each clinic appointment.   In case of problems with PAP machine or mask interface, please contact your [employer] (Durable Medical Equipment) company first. [employer] is the company who provides you the machine and/or PAP supplies.       PRESCRIPTIONS     We require 7 days advanced notice for prescription refills. If we do not receive the request in this time, we cannot guarantee that your medication will be refilled in time.    IMPORTANT PHONE NUMBERS     Sleep Medicine Clinic Fax: [phone]  Appointments (for Pediatric Sleep Clinic): [phone] - option 1  Appointments (for Adult Sleep Clinic): [phone] - option 2  Appointments (For Sleep Studies): [phone] - option 3  Behavioral Sleep Medicine: [phone]  Sleep Surgery: [phone]  Nutrition Service: [phone]  Weight  management clinics with endocrinology: [phone]  Bariatric Services: [phone] (includes weight loss medications and weight loss surgery)  [facility]: [phone] (offers holistic approaches to weight management)  ENT (Otolaryngology): [phone]  Headache Clinic (Neurology): [phone]  Neurology: [phone]  Psychiatry: [phone]  Pulmonary Function Testing (PFT) Center: [phone]  Pulmonary Medicine: [phone]  [employer] (DME): [phone]      OUR SLEEP TESTING LOCATIONS     Our team will contact you to schedule your sleep study, however, you can contact us as follow:  Main Phone Line (scheduling only): [phone]), option 3  Adult and Pediatric Locations  [facility] (6 years and older): [facility] - 4th floor ([facility]) After hours line: [phone]  [facility] (Main campus: All ages): [facility], 6th floor. After hours line: [phone]   [place] (5 years and older; younger considered on case-by-case basis): [address]; [address], [address]. Scheduling  After hours line: [phone]   [place] (6 years and older): [address]; [address]; [address]   [place] (6 years and older): [address] [facility], [address]  After hours line: [phone]   [place] (13 years and older) in [place]: [address], 2nd floor  After hours line: [phone]   [place] (13 year and older): [address], [address]  After hours line: [phone]     Adult Only Locations:   [place] (18 years and older): [address] [facility], 2nd floor   [place] (18 years and older): [address] [facility]; 4th floor  After hours line: [phone]  [facility] (18 years and older) at [place]: [address]  After hours line: [phone]     CONTACTING YOUR SLEEP MEDICINE PROVIDER AND SLEEP TEAM      For issues with your machine or  "mask interface, please call your DME provider first. DME stands for durable medical company. DME is the company who provides you the machine and/or PAP supplies / accessories.   To schedule, cancel, or reschedule SLEEP STUDY APPOINTMENTS, please call the Main Phone Line at [phone] - option 3.   To schedule, cancel, or reschedule CLINIC APPOINTMENTS, you can do it in \"[employer]\", call [phone] to speak with my  (Isabelle Moraes), or call the Main Phone Line at [phone]) - option 2  For CLINICAL QUESTIONS or MEDICATION REFILLS, please call direct line for Adult Sleep Nurses at [phone].   Lastly, you can also send a message directly to your provider through \"My Chart\", which is the email service through your  Records Account: [email or web address]       Here at [facility], we wish you a restful sleep!   "

## 2025-05-16 DIAGNOSIS — I10 ESSENTIAL HYPERTENSION: ICD-10-CM

## 2025-05-20 ENCOUNTER — APPOINTMENT (OUTPATIENT)
Dept: PRIMARY CARE | Facility: CLINIC | Age: 35
End: 2025-05-20
Payer: COMMERCIAL

## 2025-05-20 VITALS
TEMPERATURE: 97.7 F | BODY MASS INDEX: 37.19 KG/M2 | SYSTOLIC BLOOD PRESSURE: 118 MMHG | DIASTOLIC BLOOD PRESSURE: 75 MMHG | HEART RATE: 79 BPM | WEIGHT: 315 LBS | HEIGHT: 77 IN

## 2025-05-20 DIAGNOSIS — I10 ESSENTIAL HYPERTENSION: Primary | ICD-10-CM

## 2025-05-20 DIAGNOSIS — G47.33 OSA (OBSTRUCTIVE SLEEP APNEA): ICD-10-CM

## 2025-05-20 DIAGNOSIS — Z11.3 SCREENING FOR STD (SEXUALLY TRANSMITTED DISEASE): ICD-10-CM

## 2025-05-20 DIAGNOSIS — R73.03 PREDIABETES: ICD-10-CM

## 2025-05-20 DIAGNOSIS — E66.813 CLASS 3 SEVERE OBESITY DUE TO EXCESS CALORIES WITH SERIOUS COMORBIDITY AND BODY MASS INDEX (BMI) OF 45.0 TO 49.9 IN ADULT: ICD-10-CM

## 2025-05-20 DIAGNOSIS — E78.2 MIXED HYPERLIPIDEMIA: ICD-10-CM

## 2025-05-20 DIAGNOSIS — Z13.29 THYROID DISORDER SCREENING: ICD-10-CM

## 2025-05-20 PROCEDURE — 1036F TOBACCO NON-USER: CPT | Performed by: INTERNAL MEDICINE

## 2025-05-20 PROCEDURE — 3078F DIAST BP <80 MM HG: CPT | Performed by: INTERNAL MEDICINE

## 2025-05-20 PROCEDURE — 3008F BODY MASS INDEX DOCD: CPT | Performed by: INTERNAL MEDICINE

## 2025-05-20 PROCEDURE — 3074F SYST BP LT 130 MM HG: CPT | Performed by: INTERNAL MEDICINE

## 2025-05-20 PROCEDURE — 99213 OFFICE O/P EST LOW 20 MIN: CPT | Performed by: INTERNAL MEDICINE

## 2025-05-20 RX ORDER — LISINOPRIL 20 MG/1
20 TABLET ORAL DAILY
Qty: 90 TABLET | Refills: 1 | Status: SHIPPED | OUTPATIENT
Start: 2025-05-20 | End: 2025-11-16

## 2025-05-20 RX ORDER — HYDROCHLOROTHIAZIDE 25 MG/1
25 TABLET ORAL DAILY
Qty: 90 TABLET | Refills: 1 | OUTPATIENT
Start: 2025-05-20

## 2025-05-20 RX ORDER — LISINOPRIL 20 MG/1
20 TABLET ORAL DAILY
Qty: 90 TABLET | Refills: 1 | OUTPATIENT
Start: 2025-05-20 | End: 2025-11-16

## 2025-05-20 RX ORDER — HYDROCHLOROTHIAZIDE 25 MG/1
25 TABLET ORAL DAILY
Qty: 90 TABLET | Refills: 1 | Status: SHIPPED | OUTPATIENT
Start: 2025-05-20 | End: 2025-11-16

## 2025-05-20 ASSESSMENT — ENCOUNTER SYMPTOMS
PALPITATIONS: 0
DIARRHEA: 0
DYSURIA: 0
COUGH: 0
DIZZINESS: 0
AGITATION: 0
CHILLS: 0
FEVER: 0
SORE THROAT: 0
SHORTNESS OF BREATH: 0
NAUSEA: 0
ABDOMINAL PAIN: 0
BLOOD IN STOOL: 0
CONFUSION: 0
VOMITING: 0
CONSTIPATION: 0
LIGHT-HEADEDNESS: 0

## 2025-05-20 ASSESSMENT — PATIENT HEALTH QUESTIONNAIRE - PHQ9
1. LITTLE INTEREST OR PLEASURE IN DOING THINGS: NOT AT ALL
2. FEELING DOWN, DEPRESSED OR HOPELESS: NOT AT ALL
SUM OF ALL RESPONSES TO PHQ9 QUESTIONS 1 AND 2: 0

## 2025-05-20 NOTE — PROGRESS NOTES
Subjective   Patient ID: Michele Johnson is a 34 y.o. male who presents for Follow-up (He had abnormal lab work through his employer.  He did have a headache for several days at work.  They took his blood pressure and it 200/109.  He declined ER visit.) and Med Refill.  History of Present Illness  Michele Johnson is a 34 year old male with hypertension who presents for medication refill and blood pressure management.    He has been rationing his blood pressure medications, hydrochlorothiazide and lisinopril, due to running out of them. This led to a severe headache for three days when his blood pressure was uncontrolled, with a recorded reading of 200/109 mmHg without medication. After taking one dose of medication yesterday, his blood pressure is currently 118/75 mmHg. Typically, his blood pressure ranges from 140-150 mmHg when on his medications consistently.    He has a history of sleep apnea and uses a BiPAP machine with a humidifier, which has significantly improved his sleep quality since he started using it in January. He feels much better and states, 'I sleep like a baby.'    He recalls a previous visit to a cardiologist following an ER visit for chest pain a couple of years ago. During that time, he underwent a CT calcium score test and was advised to follow up with his primary care physician.    His recent blood work, required by his employer, indicated high cholesterol levels, although he did not fast for the test. He has a family history of cardiovascular issues.    He works part-time at [employer] and has access to colleagues who can check his blood pressure. He notes that his blood pressure readings at home can be inconsistent, and he questions the accuracy of his home blood pressure machine. No chest pain or shortness of breath currently.    Review of Systems   Constitutional:  Negative for chills and fever.   HENT:  Negative for sore throat.    Eyes:  Negative for visual disturbance.   Respiratory:  " Negative for cough and shortness of breath.    Cardiovascular:  Negative for chest pain, palpitations and leg swelling.   Gastrointestinal:  Negative for abdominal pain, blood in stool, constipation, diarrhea, nausea and vomiting.   Genitourinary:  Negative for dysuria.   Skin:  Negative for rash.   Neurological:  Negative for dizziness, syncope and light-headedness.   Psychiatric/Behavioral:  Negative for agitation and confusion.        Objective     /75 (BP Location: Left arm, Patient Position: Sitting, BP Cuff Size: Large adult)   Pulse 79   Temp 36.5 °C (97.7 °F)   Ht 1.956 m (6' 5\")   Wt (!) 180 kg (397 lb)   BMI 47.08 kg/m²      Physical Exam  Vitals and nursing note reviewed.   Constitutional:       General: He is not in acute distress.     Appearance: Normal appearance. He is obese. He is not ill-appearing, toxic-appearing or diaphoretic.   HENT:      Head: Normocephalic and atraumatic.      Mouth/Throat:      Mouth: Mucous membranes are moist.      Pharynx: Oropharynx is clear. No oropharyngeal exudate.   Eyes:      Pupils: Pupils are equal, round, and reactive to light.   Cardiovascular:      Rate and Rhythm: Normal rate and regular rhythm.      Heart sounds: Normal heart sounds. No murmur heard.  Pulmonary:      Effort: Pulmonary effort is normal. No respiratory distress.      Breath sounds: Normal breath sounds. No wheezing or rhonchi.   Abdominal:      General: There is no distension.      Palpations: Abdomen is soft.      Tenderness: There is no abdominal tenderness.   Musculoskeletal:      Cervical back: Neck supple.      Right lower leg: No edema.      Left lower leg: No edema.   Lymphadenopathy:      Cervical: No cervical adenopathy.   Skin:     General: Skin is warm and dry.      Coloration: Skin is not jaundiced or pale.      Findings: No rash.   Neurological:      General: No focal deficit present.      Mental Status: He is alert and oriented to person, place, and time.      Cranial " Nerves: No cranial nerve deficit.   Psychiatric:         Mood and Affect: Mood normal.         Behavior: Behavior normal.         Thought Content: Thought content normal.         Judgment: Judgment normal.        Physical Exam  VITALS: BP- 118/75    Assessment & Plan  Essential hypertension  Hypertension is not well-controlled due to medication non-adherence. Blood pressure readings are variable, with recent high readings of 200/109 mmHg without medication and controlled readings of 118/75 mmHg in the office. Symptoms include headaches when blood pressure is elevated. He is on hydrochlorothiazide and lisinopril but has been rationing medications due to running out. There is a possibility of increasing lisinopril dosage if blood pressure remains high after consistent medication use. Discrepancies in home blood pressure cuff accuracy may contribute to high readings at home.  - Refill hydrochlorothiazide and lisinopril prescriptions.  - Instruct to take medications consistently every day.  - Recheck blood pressure after one month of consistent medication use.  - Bring home blood pressure cuff to next appointment to validate accuracy.  - Consider increasing lisinopril dosage if blood pressure remains high.    Severe obstructive sleep apnea  Severe obstructive sleep apnea is managed with BiPAP therapy, significantly improving sleep quality and reducing daytime fatigue. He reports feeling better and sleeping well with the BiPAP machine. BiPAP therapy is crucial in preventing other health problems associated with sleep apnea.    Class 3 severe obesity  Severe obesity with a BMI of 45.0 to 49.9. Previous attempt to use Wegovy was unsuccessful due to insurance coverage issues. Discussed the possibility of using Zepbound, which may be covered due to sleep apnea diagnosis. He expresses interest in weight management options but reports difficulty with exercise adherence. A colleague's success with weight management medication  was noted as a motivating factor.  - Check insurance coverage for Zepbound as a potential weight management option.    Mixed hyperlipidemia  Cholesterol levels are high in recent screenings, though specific values were not provided. Previous CT calcium score was not concerning, but due to family history and preventative reasons, further evaluation is warranted. He did not fast for previous cholesterol tests, which may have affected results.  - Order fasting lipid panel to assess current cholesterol levels.    Results  RADIOLOGY  CT calcium score: Low calcium score of 0    DIAGNOSTIC  Sleep study: Severe sleep apnea (January 2025)    Problem List Items Addressed This Visit       Mixed hyperlipidemia    Relevant Orders    CBC and Auto Differential    Comprehensive Metabolic Panel    Lipid Panel    Essential hypertension - Primary    Relevant Medications    hydroCHLOROthiazide (HYDRODiuril) 25 mg tablet    lisinopril 20 mg tablet    Other Relevant Orders    CBC and Auto Differential    Comprehensive Metabolic Panel    Prediabetes    Relevant Orders    Hemoglobin A1C    Class 3 severe obesity due to excess calories with serious comorbidity and body mass index (BMI) of 45.0 to 49.9 in adult    SHANIQUE (obstructive sleep apnea)    Screening for STD (sexually transmitted disease)    Relevant Orders    HIV 1/2 Antigen/Antibody Screen with Reflex to Confirmation    Hepatitis C antibody    Thyroid disorder screening    Relevant Orders    TSH with reflex to Free T4 if abnormal         Cezar Gotti DO     This medical note was created with the assistance of artificial intelligence (AI) for documentation purposes. The content has been reviewed and confirmed by the healthcare provider for accuracy and completeness. Patient consented to the use of audio recording and use of AI during their visit.

## 2025-06-11 LAB
ALBUMIN SERPL-MCNC: 4.4 G/DL (ref 3.6–5.1)
ALP SERPL-CCNC: 75 U/L (ref 36–130)
ALT SERPL-CCNC: 35 U/L (ref 9–46)
ANION GAP SERPL CALCULATED.4IONS-SCNC: 11 MMOL/L (CALC) (ref 7–17)
AST SERPL-CCNC: 20 U/L (ref 10–40)
BASOPHILS # BLD AUTO: 49 CELLS/UL (ref 0–200)
BASOPHILS NFR BLD AUTO: 0.6 %
BILIRUB SERPL-MCNC: 0.8 MG/DL (ref 0.2–1.2)
BUN SERPL-MCNC: 14 MG/DL (ref 7–25)
CALCIUM SERPL-MCNC: 9.3 MG/DL (ref 8.6–10.3)
CHLORIDE SERPL-SCNC: 100 MMOL/L (ref 98–110)
CHOLEST SERPL-MCNC: 197 MG/DL
CHOLEST/HDLC SERPL: 6.8 (CALC)
CO2 SERPL-SCNC: 27 MMOL/L (ref 20–32)
CREAT SERPL-MCNC: 0.83 MG/DL (ref 0.6–1.26)
EGFRCR SERPLBLD CKD-EPI 2021: 116 ML/MIN/1.73M2
EOSINOPHIL # BLD AUTO: 146 CELLS/UL (ref 15–500)
EOSINOPHIL NFR BLD AUTO: 1.8 %
ERYTHROCYTE [DISTWIDTH] IN BLOOD BY AUTOMATED COUNT: 13.3 % (ref 11–15)
EST. AVERAGE GLUCOSE BLD GHB EST-MCNC: 140 MG/DL
EST. AVERAGE GLUCOSE BLD GHB EST-SCNC: 7.7 MMOL/L
GLUCOSE SERPL-MCNC: 104 MG/DL (ref 65–99)
HBA1C MFR BLD: 6.5 %
HCT VFR BLD AUTO: 44.3 % (ref 38.5–50)
HCV AB SERPL QL IA: NORMAL
HDLC SERPL-MCNC: 29 MG/DL
HGB BLD-MCNC: 14.3 G/DL (ref 13.2–17.1)
HIV 1+2 AB+HIV1 P24 AG SERPL QL IA: NORMAL
LDLC SERPL CALC-MCNC: 127 MG/DL (CALC)
LYMPHOCYTES # BLD AUTO: 2057 CELLS/UL (ref 850–3900)
LYMPHOCYTES NFR BLD AUTO: 25.4 %
MCH RBC QN AUTO: 28.7 PG (ref 27–33)
MCHC RBC AUTO-ENTMCNC: 32.3 G/DL (ref 32–36)
MCV RBC AUTO: 88.8 FL (ref 80–100)
MONOCYTES # BLD AUTO: 462 CELLS/UL (ref 200–950)
MONOCYTES NFR BLD AUTO: 5.7 %
NEUTROPHILS # BLD AUTO: 5387 CELLS/UL (ref 1500–7800)
NEUTROPHILS NFR BLD AUTO: 66.5 %
NONHDLC SERPL-MCNC: 168 MG/DL (CALC)
PLATELET # BLD AUTO: 328 THOUSAND/UL (ref 140–400)
PMV BLD REES-ECKER: 10 FL (ref 7.5–12.5)
POTASSIUM SERPL-SCNC: 5.1 MMOL/L (ref 3.5–5.3)
PROT SERPL-MCNC: 6.8 G/DL (ref 6.1–8.1)
RBC # BLD AUTO: 4.99 MILLION/UL (ref 4.2–5.8)
SODIUM SERPL-SCNC: 138 MMOL/L (ref 135–146)
TRIGL SERPL-MCNC: 269 MG/DL
TSH SERPL-ACNC: 1.96 MIU/L (ref 0.4–4.5)
WBC # BLD AUTO: 8.1 THOUSAND/UL (ref 3.8–10.8)

## 2025-06-18 PROCEDURE — 99283 EMERGENCY DEPT VISIT LOW MDM: CPT

## 2025-06-18 PROCEDURE — 99282 EMERGENCY DEPT VISIT SF MDM: CPT

## 2025-06-18 PROCEDURE — 99284 EMERGENCY DEPT VISIT MOD MDM: CPT

## 2025-06-18 ASSESSMENT — LIFESTYLE VARIABLES
TOTAL SCORE: 0
HAVE YOU EVER FELT YOU SHOULD CUT DOWN ON YOUR DRINKING: NO
HAVE PEOPLE ANNOYED YOU BY CRITICIZING YOUR DRINKING: NO
EVER FELT BAD OR GUILTY ABOUT YOUR DRINKING: NO
EVER HAD A DRINK FIRST THING IN THE MORNING TO STEADY YOUR NERVES TO GET RID OF A HANGOVER: NO

## 2025-06-18 ASSESSMENT — PAIN SCALES - GENERAL: PAINLEVEL_OUTOF10: 8

## 2025-06-18 ASSESSMENT — PAIN DESCRIPTION - LOCATION: LOCATION: HEAD

## 2025-06-18 ASSESSMENT — PAIN - FUNCTIONAL ASSESSMENT: PAIN_FUNCTIONAL_ASSESSMENT: 0-10

## 2025-06-18 ASSESSMENT — PAIN DESCRIPTION - PAIN TYPE: TYPE: ACUTE PAIN

## 2025-06-19 ENCOUNTER — HOSPITAL ENCOUNTER (EMERGENCY)
Facility: HOSPITAL | Age: 35
Discharge: HOME | End: 2025-06-19
Payer: COMMERCIAL

## 2025-06-19 VITALS
SYSTOLIC BLOOD PRESSURE: 147 MMHG | HEIGHT: 77 IN | RESPIRATION RATE: 16 BRPM | OXYGEN SATURATION: 97 % | WEIGHT: 315 LBS | BODY MASS INDEX: 37.19 KG/M2 | TEMPERATURE: 97.9 F | DIASTOLIC BLOOD PRESSURE: 86 MMHG | HEART RATE: 84 BPM

## 2025-06-19 DIAGNOSIS — I10 PRIMARY HYPERTENSION: Primary | ICD-10-CM

## 2025-06-19 PROCEDURE — 2500000001 HC RX 250 WO HCPCS SELF ADMINISTERED DRUGS (ALT 637 FOR MEDICARE OP)

## 2025-06-19 RX ORDER — HYDROCHLOROTHIAZIDE 25 MG/1
25 TABLET ORAL ONCE
Status: COMPLETED | OUTPATIENT
Start: 2025-06-19 | End: 2025-06-19

## 2025-06-19 RX ORDER — IBUPROFEN 400 MG/1
400 TABLET, FILM COATED ORAL ONCE
Status: COMPLETED | OUTPATIENT
Start: 2025-06-19 | End: 2025-06-19

## 2025-06-19 RX ORDER — ACETAMINOPHEN 325 MG/1
975 TABLET ORAL ONCE
Status: COMPLETED | OUTPATIENT
Start: 2025-06-19 | End: 2025-06-19

## 2025-06-19 RX ADMIN — IBUPROFEN 400 MG: 400 TABLET, FILM COATED ORAL at 01:24

## 2025-06-19 RX ADMIN — HYDROCHLOROTHIAZIDE 25 MG: 25 TABLET ORAL at 01:24

## 2025-06-19 RX ADMIN — ACETAMINOPHEN 975 MG: 325 TABLET ORAL at 01:24

## 2025-06-19 ASSESSMENT — PAIN SCALES - GENERAL
PAINLEVEL_OUTOF10: 4
PAINLEVEL_OUTOF10: 6

## 2025-06-19 NOTE — ED PROVIDER NOTES
History of Present Illness     History provided by: Patient  Limitations to History: None  External Records Reviewed with Brief Summary: None    HPI:  Michele Johnson is a 34 y.o. male with history of hypertension who presents to the emergency department for concern of gradual headache, nonbloody diarrhea that has resolved, and elevated blood pressure.  He states he took his lisinopril but denies taking his hydrochlorothiazide.  He states he had an elevated blood pressure 2 weeks ago, discussed with his primary and was told if he had another elevated reading to come into the emergency department.  He denies neurological changes, fever, chills, body aches, cough, nasal congestion, ear pressure, dizziness, chest pain, shortness of breath, recent falls or recent trauma.    Physical Exam   Triage vitals:  T 36.5 °C (97.7 °F)  HR 76  BP (!) 171/95  RR 18  O2 96 % None (Room air)    Physical Exam  Vitals and nursing note reviewed.   Constitutional:       General: He is not in acute distress.     Appearance: He is not toxic-appearing.   HENT:      Head: Normocephalic and atraumatic.      Mouth/Throat:      Mouth: Mucous membranes are moist.   Eyes:      Extraocular Movements: Extraocular movements intact.      Pupils: Pupils are equal, round, and reactive to light.   Cardiovascular:      Rate and Rhythm: Normal rate and regular rhythm.   Pulmonary:      Effort: Pulmonary effort is normal.      Breath sounds: Normal breath sounds.   Abdominal:      General: Bowel sounds are normal.      Palpations: Abdomen is soft.   Musculoskeletal:         General: Normal range of motion.      Cervical back: Normal range of motion and neck supple.   Skin:     General: Skin is warm and dry.   Neurological:      Mental Status: He is alert.   Psychiatric:         Mood and Affect: Mood normal.         Behavior: Behavior normal.          Medical Decision Making & ED Course   Medical Decision Makin y.o. male who presents to the  emergency department for concern of headache since 12 noon and hypertension   Upon examination patient is alert and oriented, no neurological deficits, Speech clear and fluent, Follows commands, No facial asymmetry,   +5 strength to all extremities,decreasing concern for CVA.  Lung sounds clear to auscultation.  Regular heart rate and rhythm.    Given no recent traumatic injury, or sudden headache nonemergent CT of head deferred.  Will give patient his home dose of hydrochlorothiazide and acetaminophen for pain control.    Upon reassessment, patient blood pressure improved to 141/87 and states headache has resolved.  Patient educated to take his blood pressure medications as prescribed and to follow-up with his primary care provider.  Patient agreed with plan of care.    Differential diagnoses considered include but are not limited to: Hypertension due to missed medication dose, migraine, viral syndrome, COVID     Social Determinants of Health which Significantly Impact Care: None identified     EKG Independent Interpretation: EKG not obtained    Independent Result Review and Interpretation: Relevant laboratory and radiographic results were reviewed and independently interpreted by myself.  As necessary, they are commented on in the ED Course.    Chronic conditions affecting the patient's care: As documented above in [facility]    The patient was discussed with the following consultants/services: None    Care Considerations: As documented above in [facility]    ED Course:  Diagnoses as of 06/19/25 0203   Primary hypertension     Disposition   Discharge    Procedures   Procedures    Patient was seen independently    ROMAIN Angeles  Emergency Medicine     ROMAIN Angeles  06/19/25 0208       ROMAIN Angeles  06/19/25 0209

## 2025-06-19 NOTE — DISCHARGE INSTRUCTIONS
Take your blood pressure medication as prescribed.  Decrease your sodium intake.  Monitor your blood pressure and keep a record and provide results to your primary care physician  Follow-up with your primary care physician.  If new symptoms present return to the emergency department.